# Patient Record
Sex: FEMALE | Race: ASIAN | Employment: FULL TIME | URBAN - METROPOLITAN AREA
[De-identification: names, ages, dates, MRNs, and addresses within clinical notes are randomized per-mention and may not be internally consistent; named-entity substitution may affect disease eponyms.]

---

## 2017-02-06 ENCOUNTER — OFFICE VISIT (OUTPATIENT)
Dept: OBGYN CLINIC | Facility: CLINIC | Age: 22
End: 2017-02-06

## 2017-02-06 ENCOUNTER — APPOINTMENT (OUTPATIENT)
Dept: LAB | Facility: HOSPITAL | Age: 22
End: 2017-02-06
Attending: OBSTETRICS & GYNECOLOGY
Payer: COMMERCIAL

## 2017-02-06 ENCOUNTER — TELEPHONE (OUTPATIENT)
Dept: OBGYN CLINIC | Facility: CLINIC | Age: 22
End: 2017-02-06

## 2017-02-06 VITALS
DIASTOLIC BLOOD PRESSURE: 74 MMHG | WEIGHT: 254 LBS | SYSTOLIC BLOOD PRESSURE: 119 MMHG | HEART RATE: 85 BPM | BODY MASS INDEX: 40.82 KG/M2 | HEIGHT: 66.25 IN

## 2017-02-06 DIAGNOSIS — Z30.011 BCP (BIRTH CONTROL PILLS) INITIATION: ICD-10-CM

## 2017-02-06 DIAGNOSIS — Z01.419 ENCOUNTER FOR GYNECOLOGICAL EXAMINATION WITHOUT ABNORMAL FINDING: Primary | ICD-10-CM

## 2017-02-06 DIAGNOSIS — Z11.3 SCREEN FOR STD (SEXUALLY TRANSMITTED DISEASE): ICD-10-CM

## 2017-02-06 DIAGNOSIS — Z12.4 CERVICAL CANCER SCREENING: ICD-10-CM

## 2017-02-06 LAB
HBV SURFACE AG SERPL QL IA: NONREACTIVE
HCG SERPL QL: NEGATIVE
HCV AB SERPL QL IA: NONREACTIVE
HIV1+2 AB SERPL QL IA: NONREACTIVE
T PALLIDUM AB SER QL: NEGATIVE

## 2017-02-06 PROCEDURE — 84703 CHORIONIC GONADOTROPIN ASSAY: CPT

## 2017-02-06 PROCEDURE — 87340 HEPATITIS B SURFACE AG IA: CPT

## 2017-02-06 PROCEDURE — 87389 HIV-1 AG W/HIV-1&-2 AB AG IA: CPT

## 2017-02-06 PROCEDURE — 36415 COLL VENOUS BLD VENIPUNCTURE: CPT

## 2017-02-06 PROCEDURE — 86803 HEPATITIS C AB TEST: CPT

## 2017-02-06 PROCEDURE — 99395 PREV VISIT EST AGE 18-39: CPT | Performed by: OBSTETRICS & GYNECOLOGY

## 2017-02-06 PROCEDURE — 86780 TREPONEMA PALLIDUM: CPT

## 2017-02-06 NOTE — PROGRESS NOTES
Well Woman Exam    HPI:  The patient is a 21yo female who presents today for annual exam. She has no complaints. She has not been taking her OCPs for the last few months and has not had a period in over 3 months.  She states she has just forgotten to take t Father 59     Lymphoma    • Hyperlipidemia[other] [OTHER] Brother    • Lipids Brother      hyperlipidemia   • Diabetes         MEDICATIONS:    Current outpatient prescriptions:   •  Norethin-Eth Estradiol-Fe (ZENCHENT FE) 0.4-35 MG-MCG Oral Chew Tab, CHEW by patient being uncomfortable and scared   Perineum: normal    Assessment/Plan:  1. WWE:   1. Reviewed ASCCP guidelines with the patient   2. Tried pap today- hindered by patient being uncomfortable. 2. Contraception:   1.  OCPs  Risks of OCP's especiall

## 2017-02-06 NOTE — TELEPHONE ENCOUNTER
Called reference lab to see why STD screening was drawn today but HCG was not.  Lab is unsure what happened and will add HCG to current blood already in the lab

## 2017-02-07 LAB
C TRACH DNA SPEC QL NAA+PROBE: NEGATIVE
N GONORRHOEA DNA SPEC QL NAA+PROBE: NEGATIVE

## 2017-02-08 ENCOUNTER — TELEPHONE (OUTPATIENT)
Dept: OBGYN CLINIC | Facility: CLINIC | Age: 22
End: 2017-02-08

## 2017-02-08 RX ORDER — NORGESTIMATE AND ETHINYL ESTRADIOL 7DAYSX3 28
1 KIT ORAL DAILY
Qty: 28 TABLET | Refills: 12 | Status: SHIPPED | OUTPATIENT
Start: 2017-02-08 | End: 2017-03-08

## 2017-02-08 NOTE — TELEPHONE ENCOUNTER
Please let patient know that her STD screening and Pap were all negative. Her HCG was also negative and OCPs have been sent to her pharmacy. She can start them at anytime.

## 2017-02-08 NOTE — TELEPHONE ENCOUNTER
Informed pt that her STD screening and pap were all negative. Informed pt that her hcg was also negative and ocps have been sent to the pharmacy. Informed pt that [address] stated she can start them at anytime. Pt stated understanding.

## 2017-03-09 ENCOUNTER — HOSPITAL ENCOUNTER (OUTPATIENT)
Dept: ULTRASOUND IMAGING | Age: 22
Discharge: HOME | End: 2017-03-09
Attending: INTERNAL MEDICINE
Payer: COMMERCIAL

## 2017-03-09 DIAGNOSIS — R31.9 HEMATURIA: ICD-10-CM

## 2017-03-09 PROCEDURE — 76770 US EXAM ABDO BACK WALL COMP: CPT

## 2017-03-15 ENCOUNTER — HOSPITAL ENCOUNTER (EMERGENCY)
Facility: HOSPITAL | Age: 22
Discharge: HOME | End: 2017-03-15
Attending: EMERGENCY MEDICINE
Payer: COMMERCIAL

## 2017-03-15 VITALS
TEMPERATURE: 98 F | RESPIRATION RATE: 18 BRPM | HEART RATE: 71 BPM | DIASTOLIC BLOOD PRESSURE: 70 MMHG | SYSTOLIC BLOOD PRESSURE: 131 MMHG | OXYGEN SATURATION: 99 %

## 2017-03-15 DIAGNOSIS — J01.80 OTHER ACUTE SINUSITIS: Primary | ICD-10-CM

## 2017-03-15 PROCEDURE — 99283 EMERGENCY DEPT VISIT LOW MDM: CPT

## 2017-03-15 RX ORDER — DOXYCYCLINE HYCLATE 100 MG/1
100 CAPSULE ORAL 2 TIMES DAILY
Qty: 20 CAPSULE | Refills: 0 | Status: SHIPPED | OUTPATIENT
Start: 2017-03-15 | End: 2017-03-25

## 2017-03-15 NOTE — ED NOTES
C/o sore throat and rt ear pain , states sore throat x 10 day, seen by pmd w/- strep and - flu.  Talking clear  But softly

## 2017-03-15 NOTE — ED INITIAL ASSESSMENT (HPI)
Pt has seen her PMD 3 times for the same thing. Sore throat and runny nose. Pt was told that it was a viral infection. Pt sts she is getting worse everyday.

## 2017-03-15 NOTE — ED PROVIDER NOTES
Patient Seen in: [facility] AND [facility] Emergency Department    History   Patient presents with:  Sore Throat    Stated Complaint: sore throat, cough    HPI    Pt is a 18 yo F who p/w sinus congestion x 10 days.  Pt states she goes through a box of Kleenex a d (Oral)  Resp 18  SpO2 99%        Physical Exam    GENERAL: No acute distress, awake and alert  HEENT: MMM, EOMI, PERRL.  TMS normal. Oropharynx normal, uvula midline, no abscess  Neck: supple, non tender, no meningeal signs, no LAD  CV: RRR, no murmurs  Res

## 2017-09-29 ENCOUNTER — HOSPITAL ENCOUNTER (OUTPATIENT)
Dept: ULTRASOUND IMAGING | Age: 22
Discharge: HOME | End: 2017-09-29
Attending: INTERNAL MEDICINE
Payer: COMMERCIAL

## 2017-09-29 ENCOUNTER — LAB ENCOUNTER (OUTPATIENT)
Dept: LAB | Age: 22
End: 2017-09-29
Attending: INTERNAL MEDICINE
Payer: COMMERCIAL

## 2017-09-29 DIAGNOSIS — R22.1 LOCALIZED SWELLING, MASS AND LUMP, NECK: ICD-10-CM

## 2017-09-29 DIAGNOSIS — R22.1 SWOLLEN NECK: Primary | ICD-10-CM

## 2017-09-29 DIAGNOSIS — E07.9 THYROID MASS: ICD-10-CM

## 2017-09-29 PROCEDURE — 84439 ASSAY OF FREE THYROXINE: CPT

## 2017-09-29 PROCEDURE — 84443 ASSAY THYROID STIM HORMONE: CPT

## 2017-09-29 PROCEDURE — 36415 COLL VENOUS BLD VENIPUNCTURE: CPT

## 2017-09-29 PROCEDURE — 85025 COMPLETE CBC W/AUTO DIFF WBC: CPT

## 2017-09-29 PROCEDURE — 76536 US EXAM OF HEAD AND NECK: CPT | Performed by: INTERNAL MEDICINE

## 2017-10-11 ENCOUNTER — OFFICE VISIT (OUTPATIENT)
Dept: ENDOCRINOLOGY CLINIC | Facility: CLINIC | Age: 22
End: 2017-10-11

## 2017-10-11 VITALS
HEIGHT: 67 IN | WEIGHT: 276.38 LBS | DIASTOLIC BLOOD PRESSURE: 71 MMHG | BODY MASS INDEX: 43.38 KG/M2 | HEART RATE: 94 BPM | SYSTOLIC BLOOD PRESSURE: 104 MMHG

## 2017-10-11 DIAGNOSIS — E04.1 THYROID NODULE: Primary | ICD-10-CM

## 2017-10-11 PROCEDURE — 99212 OFFICE O/P EST SF 10 MIN: CPT | Performed by: INTERNAL MEDICINE

## 2017-10-11 PROCEDURE — 99243 OFF/OP CNSLTJ NEW/EST LOW 30: CPT | Performed by: INTERNAL MEDICINE

## 2017-10-11 NOTE — H&P
New Patient Evaluation - History and Physical    CONSULT - Reason for Visit: Thyroid nodule  Requesting Physician: Barbara Guzman MD    CHIEF COMPLAINT:    Thyroid nodule     HISTORY OF PRESENT ILLNESS:   Barnett Skiff is a 23year old female who presents f Other        ASSESSMENTS:       REVIEW OF SYSTEMS:  Constitutional: Negative for: weight change, fever, fatigue, cold/heat intolerance  Eyes: Negative for:  Visual changes, proptosis, blurring  ENT: Negative for:  dysphagia, neck swelling, dysphonia  Respi Heterogeneous echogenicity. No masses. OTHER:             No masses or enlarged/pathologic appearing adenopathy. A few small normal benign lymph nodes left neck. CONCLUSION:   1. Bilateral thyroid lobe enlargement, right greater than left.  Mild

## 2017-10-18 ENCOUNTER — LAB ENCOUNTER (OUTPATIENT)
Dept: LAB | Age: 22
End: 2017-10-18
Attending: OBSTETRICS & GYNECOLOGY
Payer: COMMERCIAL

## 2017-10-18 ENCOUNTER — OFFICE VISIT (OUTPATIENT)
Dept: OBGYN CLINIC | Facility: CLINIC | Age: 22
End: 2017-10-18

## 2017-10-18 VITALS
HEART RATE: 74 BPM | SYSTOLIC BLOOD PRESSURE: 127 MMHG | BODY MASS INDEX: 43 KG/M2 | DIASTOLIC BLOOD PRESSURE: 85 MMHG | WEIGHT: 276 LBS

## 2017-10-18 DIAGNOSIS — N94.10 DYSPAREUNIA IN FEMALE: ICD-10-CM

## 2017-10-18 DIAGNOSIS — N94.2 VAGINISMUS: Primary | ICD-10-CM

## 2017-10-18 DIAGNOSIS — E28.2 PCOS (POLYCYSTIC OVARIAN SYNDROME): ICD-10-CM

## 2017-10-18 PROCEDURE — 84703 CHORIONIC GONADOTROPIN ASSAY: CPT

## 2017-10-18 PROCEDURE — 99213 OFFICE O/P EST LOW 20 MIN: CPT | Performed by: OBSTETRICS & GYNECOLOGY

## 2017-10-18 PROCEDURE — 36415 COLL VENOUS BLD VENIPUNCTURE: CPT

## 2017-10-18 RX ORDER — NORETHINDRONE ACETATE AND ETHINYL ESTRADIOL 1MG-20(21)
1 KIT ORAL DAILY
Qty: 1 PACKAGE | Refills: 12 | Status: SHIPPED | OUTPATIENT
Start: 2017-10-18 | End: 2018-02-07
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
## 2017-10-19 ENCOUNTER — TELEPHONE (OUTPATIENT)
Dept: ENDOCRINOLOGY CLINIC | Facility: CLINIC | Age: 22
End: 2017-10-19

## 2017-10-19 NOTE — TELEPHONE ENCOUNTER
No, unlikely related given the small size of nodule in the gland. Please follow up with PCP regarding the small bump behind her ear more likely a lymph node.

## 2017-10-19 NOTE — TELEPHONE ENCOUNTER
Pt was wondering if lump found in thyroid may be associated with lump she found in the back of right ear. Pls call - aware AM is not in office. Kevin perez.

## 2017-10-19 NOTE — TELEPHONE ENCOUNTER
Spoke with patient and informed her per [facility] unlikely that the bump behind her ear is related to the thyroid. She is agreeable with plan to follow up with PCP.

## 2017-10-21 ENCOUNTER — TELEPHONE (OUTPATIENT)
Dept: OBGYN CLINIC | Facility: CLINIC | Age: 22
End: 2017-10-21

## 2017-10-21 NOTE — TELEPHONE ENCOUNTER
----- Message from Jayashree Hogue MD sent at 10/19/2017  8:16 AM CDT -----  Please let patient now her HCG was negative.  Ok to start OCPs

## 2017-10-23 ENCOUNTER — OFFICE VISIT (OUTPATIENT)
Dept: OTOLARYNGOLOGY | Facility: CLINIC | Age: 22
End: 2017-10-23

## 2017-10-23 VITALS
WEIGHT: 276 LBS | HEART RATE: 79 BPM | DIASTOLIC BLOOD PRESSURE: 66 MMHG | SYSTOLIC BLOOD PRESSURE: 104 MMHG | HEIGHT: 67 IN | TEMPERATURE: 98 F | BODY MASS INDEX: 43.32 KG/M2 | RESPIRATION RATE: 16 BRPM

## 2017-10-23 DIAGNOSIS — R22.0 MASS OF POSTAURICULAR AREA: Primary | ICD-10-CM

## 2017-10-23 PROCEDURE — 99212 OFFICE O/P EST SF 10 MIN: CPT | Performed by: OTOLARYNGOLOGY

## 2017-10-23 PROCEDURE — 99243 OFF/OP CNSLTJ NEW/EST LOW 30: CPT | Performed by: OTOLARYNGOLOGY

## 2017-10-24 NOTE — PROGRESS NOTES
Luh Grace is a 23year old female. Patient presents with:  Lump: Patient present for consult for lump behind right ear for past 10 days. Denies pain or discharge. HPI:   Feels a bump behind her right ear. No change during this time.  No pain or r affect. Lymph Detail Normal Submental. Submandibular. Anterior cervical. Posterior cervical. Supraclavicular.    Eyes Normal Conjunctiva - Right: Normal, Left: Normal. Pupil - Right: Normal, Left: Normal.    Ears Normal Inspection - Right: Normal, Left: N

## 2017-12-29 ENCOUNTER — LAB ENCOUNTER (OUTPATIENT)
Dept: LAB | Age: 22
End: 2017-12-29
Attending: INTERNAL MEDICINE
Payer: COMMERCIAL

## 2017-12-29 DIAGNOSIS — R63.5 ABNORMAL WEIGHT GAIN: ICD-10-CM

## 2017-12-29 DIAGNOSIS — M62.89 MUSCLE FATIGUE: Primary | ICD-10-CM

## 2017-12-29 PROCEDURE — 85025 COMPLETE CBC W/AUTO DIFF WBC: CPT

## 2017-12-29 PROCEDURE — 80061 LIPID PANEL: CPT

## 2017-12-29 PROCEDURE — 84443 ASSAY THYROID STIM HORMONE: CPT

## 2017-12-29 PROCEDURE — 80053 COMPREHEN METABOLIC PANEL: CPT

## 2017-12-29 PROCEDURE — 36415 COLL VENOUS BLD VENIPUNCTURE: CPT

## 2017-12-29 PROCEDURE — 81001 URINALYSIS AUTO W/SCOPE: CPT

## 2017-12-29 PROCEDURE — 84439 ASSAY OF FREE THYROXINE: CPT

## 2018-01-08 ENCOUNTER — APPOINTMENT (OUTPATIENT)
Dept: CT IMAGING | Facility: HOSPITAL | Age: 23
End: 2018-01-08
Attending: EMERGENCY MEDICINE
Payer: COMMERCIAL

## 2018-01-08 ENCOUNTER — HOSPITAL ENCOUNTER (EMERGENCY)
Facility: HOSPITAL | Age: 23
Discharge: HOME | End: 2018-01-08
Attending: EMERGENCY MEDICINE
Payer: COMMERCIAL

## 2018-01-08 VITALS
HEIGHT: 67 IN | HEART RATE: 104 BPM | WEIGHT: 270 LBS | SYSTOLIC BLOOD PRESSURE: 116 MMHG | TEMPERATURE: 99 F | DIASTOLIC BLOOD PRESSURE: 60 MMHG | OXYGEN SATURATION: 96 % | BODY MASS INDEX: 42.38 KG/M2 | RESPIRATION RATE: 20 BRPM

## 2018-01-08 DIAGNOSIS — N30.00 ACUTE CYSTITIS WITHOUT HEMATURIA: Primary | ICD-10-CM

## 2018-01-08 DIAGNOSIS — R91.1 PULMONARY NODULE: ICD-10-CM

## 2018-01-08 DIAGNOSIS — K52.9 GASTROENTERITIS: ICD-10-CM

## 2018-01-08 LAB
ALBUMIN SERPL BCP-MCNC: 3.7 G/DL (ref 3.5–4.8)
ALBUMIN/GLOB SERPL: 1 {RATIO} (ref 1–2)
ALP SERPL-CCNC: 57 U/L (ref 32–100)
ALT SERPL-CCNC: 42 U/L (ref 14–54)
ANION GAP SERPL CALC-SCNC: 10 MMOL/L (ref 0–18)
AST SERPL-CCNC: 34 U/L (ref 15–41)
B-HCG UR QL: NEGATIVE
BASOPHILS # BLD: 0 K/UL (ref 0–0.2)
BASOPHILS NFR BLD: 0 %
BILIRUB SERPL-MCNC: 0.5 MG/DL (ref 0.3–1.2)
BILIRUB UR QL: NEGATIVE
BUN SERPL-MCNC: 13 MG/DL (ref 8–20)
BUN/CREAT SERPL: 19.1 (ref 10–20)
CALCIUM SERPL-MCNC: 8.7 MG/DL (ref 8.5–10.5)
CHLORIDE SERPL-SCNC: 101 MMOL/L (ref 95–110)
CO2 SERPL-SCNC: 25 MMOL/L (ref 22–32)
COLOR UR: YELLOW
CREAT SERPL-MCNC: 0.68 MG/DL (ref 0.5–1.5)
EOSINOPHIL # BLD: 0.1 K/UL (ref 0–0.7)
EOSINOPHIL NFR BLD: 1 %
ERYTHROCYTE [DISTWIDTH] IN BLOOD BY AUTOMATED COUNT: 13.9 % (ref 11–15)
GLOBULIN PLAS-MCNC: 3.7 G/DL (ref 2.5–3.7)
GLUCOSE SERPL-MCNC: 115 MG/DL (ref 70–99)
GLUCOSE UR-MCNC: NEGATIVE MG/DL
HCT VFR BLD AUTO: 43.5 % (ref 35–48)
HGB BLD-MCNC: 14.5 G/DL (ref 12–16)
HGB UR QL STRIP.AUTO: NEGATIVE
KETONES UR-MCNC: NEGATIVE MG/DL
LIPASE SERPL-CCNC: 19 U/L (ref 22–51)
LYMPHOCYTES # BLD: 1.9 K/UL (ref 1–4)
LYMPHOCYTES NFR BLD: 12 %
MCH RBC QN AUTO: 26.6 PG (ref 27–32)
MCHC RBC AUTO-ENTMCNC: 33.3 G/DL (ref 32–37)
MCV RBC AUTO: 79.7 FL (ref 80–100)
MONOCYTES # BLD: 0.7 K/UL (ref 0–1)
MONOCYTES NFR BLD: 4 %
NEUTROPHILS # BLD AUTO: 13.3 K/UL (ref 1.8–7.7)
NEUTROPHILS NFR BLD: 83 %
NITRITE UR QL STRIP.AUTO: POSITIVE
OSMOLALITY UR CALC.SUM OF ELEC: 283 MOSM/KG (ref 275–295)
PH UR: 7 [PH] (ref 5–8)
PLATELET # BLD AUTO: 278 K/UL (ref 140–400)
PMV BLD AUTO: 9.2 FL (ref 7.4–10.3)
POTASSIUM SERPL-SCNC: 4 MMOL/L (ref 3.3–5.1)
PROT SERPL-MCNC: 7.4 G/DL (ref 5.9–8.4)
PROT UR-MCNC: NEGATIVE MG/DL
RBC # BLD AUTO: 5.45 M/UL (ref 3.7–5.4)
RBC #/AREA URNS AUTO: 1 /HPF
SODIUM SERPL-SCNC: 136 MMOL/L (ref 136–144)
SP GR UR STRIP: 1.02 (ref 1–1.03)
UROBILINOGEN UR STRIP-ACNC: <2
VIT C UR-MCNC: NEGATIVE MG/DL
WBC # BLD AUTO: 16 K/UL (ref 4–11)
WBC #/AREA URNS AUTO: 24 /HPF

## 2018-01-08 PROCEDURE — 74177 CT ABD & PELVIS W/CONTRAST: CPT | Performed by: EMERGENCY MEDICINE

## 2018-01-08 PROCEDURE — 80053 COMPREHEN METABOLIC PANEL: CPT | Performed by: EMERGENCY MEDICINE

## 2018-01-08 PROCEDURE — 83690 ASSAY OF LIPASE: CPT | Performed by: EMERGENCY MEDICINE

## 2018-01-08 PROCEDURE — 96361 HYDRATE IV INFUSION ADD-ON: CPT

## 2018-01-08 PROCEDURE — 96374 THER/PROPH/DIAG INJ IV PUSH: CPT

## 2018-01-08 PROCEDURE — 87086 URINE CULTURE/COLONY COUNT: CPT | Performed by: EMERGENCY MEDICINE

## 2018-01-08 PROCEDURE — 96375 TX/PRO/DX INJ NEW DRUG ADDON: CPT

## 2018-01-08 PROCEDURE — 85025 COMPLETE CBC W/AUTO DIFF WBC: CPT | Performed by: EMERGENCY MEDICINE

## 2018-01-08 PROCEDURE — S0028 INJECTION, FAMOTIDINE, 20 MG: HCPCS | Performed by: EMERGENCY MEDICINE

## 2018-01-08 PROCEDURE — 81025 URINE PREGNANCY TEST: CPT

## 2018-01-08 PROCEDURE — 99284 EMERGENCY DEPT VISIT MOD MDM: CPT

## 2018-01-08 PROCEDURE — 81001 URINALYSIS AUTO W/SCOPE: CPT | Performed by: EMERGENCY MEDICINE

## 2018-01-08 RX ORDER — KETOROLAC TROMETHAMINE 15 MG/ML
15 INJECTION, SOLUTION INTRAMUSCULAR; INTRAVENOUS ONCE
Status: COMPLETED | OUTPATIENT
Start: 2018-01-08 | End: 2018-01-08

## 2018-01-08 RX ORDER — ONDANSETRON 4 MG/1
4 TABLET, ORALLY DISINTEGRATING ORAL EVERY 8 HOURS PRN
Qty: 6 TABLET | Refills: 0 | Status: SHIPPED | OUTPATIENT
Start: 2018-01-08 | End: 2019-08-16

## 2018-01-08 RX ORDER — METOCLOPRAMIDE HYDROCHLORIDE 5 MG/ML
5 INJECTION INTRAMUSCULAR; INTRAVENOUS ONCE
Status: COMPLETED | OUTPATIENT
Start: 2018-01-08 | End: 2018-01-08

## 2018-01-08 RX ORDER — ONDANSETRON 2 MG/ML
4 INJECTION INTRAMUSCULAR; INTRAVENOUS ONCE
Status: COMPLETED | OUTPATIENT
Start: 2018-01-08 | End: 2018-01-08

## 2018-01-08 RX ORDER — FAMOTIDINE 10 MG/ML
20 INJECTION, SOLUTION INTRAVENOUS ONCE
Status: COMPLETED | OUTPATIENT
Start: 2018-01-08 | End: 2018-01-08

## 2018-01-08 RX ORDER — NITROFURANTOIN 25; 75 MG/1; MG/1
100 CAPSULE ORAL 2 TIMES DAILY
Qty: 14 CAPSULE | Refills: 0 | Status: SHIPPED | OUTPATIENT
Start: 2018-01-08 | End: 2018-01-15

## 2018-01-08 RX ORDER — DICYCLOMINE HYDROCHLORIDE 10 MG/5ML
20 SOLUTION ORAL ONCE
Status: COMPLETED | OUTPATIENT
Start: 2018-01-08 | End: 2018-01-08

## 2018-01-08 NOTE — ED NOTES
Assumed care of patient from triage. Patient to ED from home for N/V/D. Patient states that she started having N/V/D since approximately 2200 last night. Patient reports N/V x6.  Patient is alert and oriented x4, breathing with ease, ambulating with steady

## 2018-01-08 NOTE — ED NOTES
Pt states nausea improved after ordered medications given, but pt states mid abd pain still present and rates it 7/10 \"burning\" pain.

## 2018-01-08 NOTE — ED PROVIDER NOTES
Patient Seen in: [facility] AND [facility] Emergency Department    History   Patient presents with:  Nausea/Vomiting/Diarrhea (gastrointestinal)    Stated Complaint: n/v/d    HPI    75-year-old female with somewhat acute onset of nausea vomiting diarrhea startin person, place, and time. Skin: Skin is warm and dry. Psychiatric: Normal mood and affect. Behavior is normal.   Nursing note and vitals reviewed. Differential diagnosis includes viral syndrome, gastritis and enteritis, UTI.       ED Course     Labs [facility], X CHEST PA LAT ROUTINE, 8/20/2014, 8:46. INDICATIONS: Patient is having nausea vomiting and diarrhea since last night with abdomen pain .   TECHNIQUE: CT images of the abdomen and pelvis were obtained with non-ionic intravenous contrast material. within the right colon suggestive of diarrhea. Normal appendix. 2 nodular opacities within the right lung measuring up to 2 cm within the right lower lobe. Findings may relate to remote inflammation. Pneumonia is felt less likely.  Followup CT recommended

## 2018-01-15 ENCOUNTER — TELEPHONE (OUTPATIENT)
Dept: PULMONOLOGY | Facility: CLINIC | Age: 23
End: 2018-01-15

## 2018-01-18 ENCOUNTER — OFFICE VISIT (OUTPATIENT)
Dept: PULMONOLOGY | Facility: CLINIC | Age: 23
End: 2018-01-18

## 2018-01-18 VITALS
HEIGHT: 67 IN | WEIGHT: 286 LBS | HEART RATE: 98 BPM | OXYGEN SATURATION: 98 % | TEMPERATURE: 98 F | BODY MASS INDEX: 44.89 KG/M2 | DIASTOLIC BLOOD PRESSURE: 82 MMHG | SYSTOLIC BLOOD PRESSURE: 126 MMHG

## 2018-01-18 DIAGNOSIS — R91.8 PULMONARY NODULES: Primary | ICD-10-CM

## 2018-01-18 PROCEDURE — 99212 OFFICE O/P EST SF 10 MIN: CPT | Performed by: INTERNAL MEDICINE

## 2018-01-18 PROCEDURE — 99244 OFF/OP CNSLTJ NEW/EST MOD 40: CPT | Performed by: INTERNAL MEDICINE

## 2018-01-18 RX ORDER — CLOBETASOL PROPIONATE 0.5 MG/G
OINTMENT TOPICAL
Refills: 0 | COMMUNITY
Start: 2017-12-02 | End: 2019-08-16

## 2018-01-18 NOTE — PROGRESS NOTES
Dear Leopold Glen:           As you know, Fouzia Flores is a 15-year-old female who I am now evaluating for pulmonary nodules. HISTORY OF PRESENT ILLNESS: The patient is a lifetime non-smoker. She grew up in the area but has visited [place].   She has no history of Neurologic grossly intact with symmetric tone and strength and reflex. Crowded oropharynx Mallampati score 3. LABORATORY: CT scan the abdomen– Nondilated fluid-filled loops of small bowel are nonspecific but can be seen with enteritis.      Liquid stool

## 2018-01-18 NOTE — PATIENT INSTRUCTIONS
Prior authorization is necessary for CT, [facility] (p. #[phone]) will obtain prior authorization, and notify you when you can proceed to schedule the test. Thanks.

## 2018-01-31 ENCOUNTER — HOSPITAL ENCOUNTER (OUTPATIENT)
Dept: CT IMAGING | Facility: HOSPITAL | Age: 23
Discharge: HOME | End: 2018-01-31
Attending: INTERNAL MEDICINE
Payer: COMMERCIAL

## 2018-01-31 ENCOUNTER — APPOINTMENT (OUTPATIENT)
Dept: LAB | Facility: HOSPITAL | Age: 23
End: 2018-01-31
Attending: INTERNAL MEDICINE
Payer: COMMERCIAL

## 2018-01-31 DIAGNOSIS — R91.8 PULMONARY NODULES: ICD-10-CM

## 2018-01-31 LAB — CREAT BLD-MCNC: 0.7 MG/DL (ref 0.5–1.5)

## 2018-01-31 PROCEDURE — 86480 TB TEST CELL IMMUN MEASURE: CPT

## 2018-01-31 PROCEDURE — 36415 COLL VENOUS BLD VENIPUNCTURE: CPT

## 2018-01-31 PROCEDURE — 82565 ASSAY OF CREATININE: CPT

## 2018-01-31 PROCEDURE — 71260 CT THORAX DX C+: CPT | Performed by: INTERNAL MEDICINE

## 2018-02-02 LAB
M TB IFN-G CD4+ BCKGRND COR BLD-ACNC: -0 IU/ML
M TB IFN-G CD4+ T-CELLS BLD-ACNC: 0.02 IU/ML
M TB TUBERC IFN-G BLD QL: NEGATIVE
M TB TUBERC IGNF/MITOGEN IGNF CONTROL: >10 IU/ML

## 2018-02-06 ENCOUNTER — TELEPHONE (OUTPATIENT)
Dept: PULMONOLOGY | Facility: CLINIC | Age: 23
End: 2018-02-06

## 2018-02-06 DIAGNOSIS — R91.1 PULMONARY NODULE: Primary | ICD-10-CM

## 2018-02-06 DIAGNOSIS — R91.8 PULMONARY NODULES: ICD-10-CM

## 2018-02-06 NOTE — TELEPHONE ENCOUNTER
RN, I spoke with the patient regarding the results of her CT scan of the chest.  1 of the nodular abnormalities is resolved and another persisted.   Please add to the calendar a repeat CT scan the chest at the 3 month interval.

## 2018-02-07 ENCOUNTER — OFFICE VISIT (OUTPATIENT)
Dept: OBGYN CLINIC | Facility: CLINIC | Age: 23
End: 2018-02-07

## 2018-02-07 VITALS
BODY MASS INDEX: 45 KG/M2 | HEART RATE: 86 BPM | DIASTOLIC BLOOD PRESSURE: 88 MMHG | WEIGHT: 286 LBS | SYSTOLIC BLOOD PRESSURE: 118 MMHG

## 2018-02-07 DIAGNOSIS — Z01.419 ENCOUNTER FOR GYNECOLOGICAL EXAMINATION WITHOUT ABNORMAL FINDING: Primary | ICD-10-CM

## 2018-02-07 DIAGNOSIS — Z30.41 ENCOUNTER FOR BIRTH CONTROL PILLS MAINTENANCE: ICD-10-CM

## 2018-02-07 PROCEDURE — 99395 PREV VISIT EST AGE 18-39: CPT | Performed by: OBSTETRICS & GYNECOLOGY

## 2018-02-07 RX ORDER — NORETHINDRONE ACETATE AND ETHINYL ESTRADIOL 1MG-20(21)
1 KIT ORAL DAILY
Qty: 1 PACKAGE | Refills: 12 | Status: SHIPPED | OUTPATIENT
Start: 2018-02-07 | End: 2019-02-07

## 2018-02-07 NOTE — PROGRESS NOTES
Well Woman Exam    HPI:  The patient is a 15yo female who presents for annual exam. She has no complaints today. She has light bleeding with OCPs. She is happy on OCPs. She states she has tried [employer] and no longer has dyspareunia.  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 , Disp: 1 Package, Rfl: 12  •  Clobetasol Propionate 0.05 % External Ointment, REMBERTO EXT AA BID, Disp: , Rfl: 0  •  ondansetron 4 MG Oral Tablet Dispersible, Take 1 tablet (4 mg total) by mouth every 8 (eight) hours as needed. , Disp: 6 normal    Assessment/Plan:  1. WWE:   1. Reviewed ASCCP guidelines with the patient   2. Pap negative 2017- repeat 2020  2. Contraception:   1. Continue OCPs  3. Breast Health:     1.  Reviewed current guidelines with recommendation to start mammograms at a

## 2018-04-24 ENCOUNTER — TELEPHONE (OUTPATIENT)
Dept: PULMONOLOGY | Facility: CLINIC | Age: 23
End: 2018-04-24

## 2018-04-24 NOTE — TELEPHONE ENCOUNTER
Managed Care: please process prior authorization for CT Chest due this upcoming month and contact patient. Thank you. Letter sent to patient to inform testing is due.

## 2018-05-05 ENCOUNTER — HOSPITAL ENCOUNTER (OUTPATIENT)
Dept: CT IMAGING | Facility: HOSPITAL | Age: 23
Discharge: HOME | End: 2018-05-05
Attending: INTERNAL MEDICINE
Payer: COMMERCIAL

## 2018-05-05 DIAGNOSIS — R91.8 PULMONARY NODULES: ICD-10-CM

## 2018-05-05 PROCEDURE — 82565 ASSAY OF CREATININE: CPT

## 2018-05-05 PROCEDURE — 71260 CT THORAX DX C+: CPT | Performed by: INTERNAL MEDICINE

## 2018-05-07 ENCOUNTER — TELEPHONE (OUTPATIENT)
Dept: PULMONOLOGY | Facility: CLINIC | Age: 23
End: 2018-05-07

## 2018-05-07 NOTE — TELEPHONE ENCOUNTER
Pt states she 2 CTs done and spoke with dr Thierno Kirby this morning and advised her to call and get appt set up please call thank you.

## 2018-05-07 NOTE — TELEPHONE ENCOUNTER
PJC please advise on appt for pt, please note no availability this Thurs 5/10 or Monday 5/14. Thanks.

## 2018-05-09 NOTE — TELEPHONE ENCOUNTER
No appointments available Monday 5/14 (verified with Nancy Andrews RN Supervisor), do you want to add Tues 5/15 @ 5pm?

## 2018-05-09 NOTE — TELEPHONE ENCOUNTER
No appointments available Monday 5/14 (verified with Carlos Burch RN Supervisor), do you want to add Tues 5/15 @ 5pm or Thurs 5/17 @ 2:15 pm?     LM for pt stting awaiting response re: appt will cb once we have date/time.

## 2018-05-17 ENCOUNTER — OFFICE VISIT (OUTPATIENT)
Dept: PULMONOLOGY | Facility: CLINIC | Age: 23
End: 2018-05-17

## 2018-05-17 VITALS
HEART RATE: 80 BPM | HEIGHT: 68 IN | RESPIRATION RATE: 18 BRPM | DIASTOLIC BLOOD PRESSURE: 68 MMHG | SYSTOLIC BLOOD PRESSURE: 95 MMHG | OXYGEN SATURATION: 96 % | BODY MASS INDEX: 44.35 KG/M2 | WEIGHT: 292.63 LBS

## 2018-05-17 DIAGNOSIS — R91.8 PULMONARY NODULES: Primary | ICD-10-CM

## 2018-05-17 PROCEDURE — 99212 OFFICE O/P EST SF 10 MIN: CPT | Performed by: INTERNAL MEDICINE

## 2018-05-17 PROCEDURE — 1111F DSCHRG MED/CURRENT MED MERGE: CPT | Performed by: INTERNAL MEDICINE

## 2018-05-17 PROCEDURE — 99213 OFFICE O/P EST LOW 20 MIN: CPT | Performed by: INTERNAL MEDICINE

## 2018-05-17 NOTE — PROGRESS NOTES
The patient is 78-year-old female who I know well from prior evaluation who comes in now for follow-up. Serial CT scanning was performed. There is subtle increase in the 2 nodular areas at the right lung base and one new nodular area.   She otherwise is d

## 2018-07-31 ENCOUNTER — TELEPHONE (OUTPATIENT)
Dept: PULMONOLOGY | Facility: CLINIC | Age: 23
End: 2018-07-31

## 2018-08-04 ENCOUNTER — HOSPITAL ENCOUNTER (OUTPATIENT)
Dept: CT IMAGING | Age: 23
Discharge: HOME | End: 2018-08-04
Attending: INTERNAL MEDICINE
Payer: COMMERCIAL

## 2018-08-04 DIAGNOSIS — R91.8 PULMONARY NODULES: ICD-10-CM

## 2018-08-04 LAB — CREAT BLD-MCNC: 0.6 MG/DL (ref 0.5–1.5)

## 2018-08-04 PROCEDURE — 82565 ASSAY OF CREATININE: CPT

## 2018-08-04 PROCEDURE — 71260 CT THORAX DX C+: CPT | Performed by: INTERNAL MEDICINE

## 2018-08-08 ENCOUNTER — TELEPHONE (OUTPATIENT)
Dept: PULMONOLOGY | Facility: CLINIC | Age: 23
End: 2018-08-08

## 2018-08-08 DIAGNOSIS — R93.89 ABNORMAL CT OF THE CHEST: Primary | ICD-10-CM

## 2018-08-08 NOTE — TELEPHONE ENCOUNTER
RN,  I spoke to the patient:  Please facilitate PET and she will need to see me in follow-up shortly thereafter as she does not feel good.

## 2018-08-08 NOTE — TELEPHONE ENCOUNTER
Spoke w/ pt provided phone # to [facility] [phone] and Central Scheduling phone # [phone], pt aware PA needed for PET prior to scheduling. Pt also instructed to cb clinic after PET is scheduled to be added to Dr. Hedy Figueora clinic as he directed. Pt voiced understanding. [facility]: please call patient w/ update on authorization of PET Scan. Thank You!

## 2018-08-10 ENCOUNTER — HOSPITAL ENCOUNTER (OUTPATIENT)
Dept: NUCLEAR MEDICINE | Facility: HOSPITAL | Age: 23
Discharge: HOME | End: 2018-08-10
Attending: INTERNAL MEDICINE
Payer: COMMERCIAL

## 2018-08-10 ENCOUNTER — TELEPHONE (OUTPATIENT)
Dept: PULMONOLOGY | Facility: CLINIC | Age: 23
End: 2018-08-10

## 2018-08-10 DIAGNOSIS — R93.89 ABNORMAL CT OF THE CHEST: ICD-10-CM

## 2018-08-10 LAB — GLUCOSE BLDC GLUCOMTR-MCNC: 122 MG/DL (ref 70–99)

## 2018-08-10 PROCEDURE — 82962 GLUCOSE BLOOD TEST: CPT

## 2018-08-10 PROCEDURE — 78815 PET IMAGE W/CT SKULL-THIGH: CPT | Performed by: INTERNAL MEDICINE

## 2018-08-10 NOTE — TELEPHONE ENCOUNTER
Pt states she had PET scan today and was instructed to make a follow up appt with PJC. Pls call. Thank you.

## 2018-08-10 NOTE — TELEPHONE ENCOUNTER
Pt offered appt Monday 8/13 @ 2:30 pm w/ Dr. Mauricio Flores to f/u after PET, pt accepted, location information given.

## 2018-08-13 ENCOUNTER — OFFICE VISIT (OUTPATIENT)
Dept: PULMONOLOGY | Facility: CLINIC | Age: 23
End: 2018-08-13
Payer: COMMERCIAL

## 2018-08-13 VITALS
OXYGEN SATURATION: 97 % | DIASTOLIC BLOOD PRESSURE: 70 MMHG | SYSTOLIC BLOOD PRESSURE: 102 MMHG | RESPIRATION RATE: 19 BRPM | HEART RATE: 73 BPM | HEIGHT: 67 IN | WEIGHT: 290.19 LBS | BODY MASS INDEX: 45.55 KG/M2

## 2018-08-13 DIAGNOSIS — R91.8 PULMONARY NODULES: Primary | ICD-10-CM

## 2018-08-13 PROCEDURE — 99212 OFFICE O/P EST SF 10 MIN: CPT | Performed by: INTERNAL MEDICINE

## 2018-08-13 PROCEDURE — 99213 OFFICE O/P EST LOW 20 MIN: CPT | Performed by: INTERNAL MEDICINE

## 2018-08-13 NOTE — PROGRESS NOTES
Patient is a 40-year-old female who I know well from prior evaluation and comes in now for follow-up. She notes in general she feels poorly. There is mild dyspnea.   Her mediastinal lymphadenopathy was positive and the pulmonary nodules led up to a mild d

## 2018-08-22 ENCOUNTER — ANESTHESIA EVENT (OUTPATIENT)
Dept: ENDOSCOPY | Facility: HOSPITAL | Age: 23
End: 2018-08-22

## 2018-08-22 ENCOUNTER — HOSPITAL ENCOUNTER (OUTPATIENT)
Facility: HOSPITAL | Age: 23
Setting detail: HOSPITAL OUTPATIENT SURGERY
Discharge: HOME | End: 2018-08-22
Attending: INTERNAL MEDICINE | Admitting: INTERNAL MEDICINE
Payer: COMMERCIAL

## 2018-08-22 ENCOUNTER — SURGERY (OUTPATIENT)
Age: 23
End: 2018-08-22

## 2018-08-22 ENCOUNTER — ANESTHESIA (OUTPATIENT)
Dept: ENDOSCOPY | Facility: HOSPITAL | Age: 23
End: 2018-08-22

## 2018-08-22 VITALS
BODY MASS INDEX: 43.95 KG/M2 | RESPIRATION RATE: 26 BRPM | OXYGEN SATURATION: 97 % | HEART RATE: 89 BPM | WEIGHT: 280 LBS | DIASTOLIC BLOOD PRESSURE: 79 MMHG | SYSTOLIC BLOOD PRESSURE: 126 MMHG | TEMPERATURE: 98 F | HEIGHT: 67 IN

## 2018-08-22 DIAGNOSIS — R59.9 LYMPH NODES ENLARGED: Primary | ICD-10-CM

## 2018-08-22 DIAGNOSIS — R59.0 MEDIASTINAL LYMPHADENOPATHY: ICD-10-CM

## 2018-08-22 DIAGNOSIS — R91.8 PULMONARY NODULES: ICD-10-CM

## 2018-08-22 LAB
APTT PPP: 26 SECONDS (ref 23.2–35.3)
B-HCG UR QL: NEGATIVE
BASOPHILS NFR FLD: 0 %
EOSINOPHIL NFR FLD: 0 %
INR BLD: 0.9 (ref 0.9–1.2)
LYMPHOCYTES NFR FLD: 26 %
M TB CMPLX RRNA SPEC QL PROBE: NEGATIVE
MONOCYTES NFR FLD: 49 %
NEUTROPHILS NFR FLD: 25 %
PLATELET # BLD AUTO: 266 K/UL (ref 140–400)
PROTHROMBIN TIME: 12 SECONDS (ref 11.8–14.5)
RBC # FLD: ABNORMAL /CUMM (ref ?–1)
WBC # FLD: 202 /CUMM (ref ?–1)
WBC OTHER NFR FLD: 0 %

## 2018-08-22 PROCEDURE — 0B9D8ZX DRAINAGE OF RIGHT MIDDLE LUNG LOBE, VIA NATURAL OR ARTIFICIAL OPENING ENDOSCOPIC, DIAGNOSTIC: ICD-10-PCS | Performed by: INTERNAL MEDICINE

## 2018-08-22 PROCEDURE — 07D78ZX EXTRACTION OF THORAX LYMPHATIC, VIA NATURAL OR ARTIFICIAL OPENING ENDOSCOPIC, DIAGNOSTIC: ICD-10-PCS | Performed by: INTERNAL MEDICINE

## 2018-08-22 PROCEDURE — 31652 BRONCH EBUS SAMPLNG 1/2 NODE: CPT | Performed by: INTERNAL MEDICINE

## 2018-08-22 RX ORDER — LIDOCAINE HYDROCHLORIDE 10 MG/ML
INJECTION, SOLUTION EPIDURAL; INFILTRATION; INTRACAUDAL; PERINEURAL AS NEEDED
Status: DISCONTINUED | OUTPATIENT
Start: 2018-08-22 | End: 2018-08-22 | Stop reason: SURG

## 2018-08-22 RX ORDER — ROCURONIUM BROMIDE 10 MG/ML
INJECTION, SOLUTION INTRAVENOUS AS NEEDED
Status: DISCONTINUED | OUTPATIENT
Start: 2018-08-22 | End: 2018-08-22 | Stop reason: SURG

## 2018-08-22 RX ORDER — SODIUM CHLORIDE, SODIUM LACTATE, POTASSIUM CHLORIDE, CALCIUM CHLORIDE 600; 310; 30; 20 MG/100ML; MG/100ML; MG/100ML; MG/100ML
INJECTION, SOLUTION INTRAVENOUS CONTINUOUS
Status: DISCONTINUED | OUTPATIENT
Start: 2018-08-22 | End: 2018-08-22

## 2018-08-22 RX ORDER — ONDANSETRON 2 MG/ML
INJECTION INTRAMUSCULAR; INTRAVENOUS AS NEEDED
Status: DISCONTINUED | OUTPATIENT
Start: 2018-08-22 | End: 2018-08-22 | Stop reason: SURG

## 2018-08-22 RX ORDER — NALOXONE HYDROCHLORIDE 0.4 MG/ML
80 INJECTION, SOLUTION INTRAMUSCULAR; INTRAVENOUS; SUBCUTANEOUS AS NEEDED
Status: DISCONTINUED | OUTPATIENT
Start: 2018-08-22 | End: 2018-08-22 | Stop reason: HOSPADM

## 2018-08-22 RX ORDER — MIDAZOLAM HYDROCHLORIDE 1 MG/ML
INJECTION INTRAMUSCULAR; INTRAVENOUS AS NEEDED
Status: DISCONTINUED | OUTPATIENT
Start: 2018-08-22 | End: 2018-08-22 | Stop reason: SURG

## 2018-08-22 RX ORDER — NEOSTIGMINE METHYLSULFATE 0.5 MG/ML
INJECTION INTRAVENOUS AS NEEDED
Status: DISCONTINUED | OUTPATIENT
Start: 2018-08-22 | End: 2018-08-22 | Stop reason: SURG

## 2018-08-22 RX ORDER — ONDANSETRON 2 MG/ML
4 INJECTION INTRAMUSCULAR; INTRAVENOUS EVERY 6 HOURS PRN
Status: DISCONTINUED | OUTPATIENT
Start: 2018-08-22 | End: 2018-08-22 | Stop reason: HOSPADM

## 2018-08-22 RX ORDER — METOCLOPRAMIDE HYDROCHLORIDE 5 MG/ML
10 INJECTION INTRAMUSCULAR; INTRAVENOUS EVERY 6 HOURS PRN
Status: DISCONTINUED | OUTPATIENT
Start: 2018-08-22 | End: 2018-08-22

## 2018-08-22 RX ORDER — SODIUM CHLORIDE, SODIUM LACTATE, POTASSIUM CHLORIDE, CALCIUM CHLORIDE 600; 310; 30; 20 MG/100ML; MG/100ML; MG/100ML; MG/100ML
INJECTION, SOLUTION INTRAVENOUS CONTINUOUS PRN
Status: DISCONTINUED | OUTPATIENT
Start: 2018-08-22 | End: 2018-08-22 | Stop reason: SURG

## 2018-08-22 RX ORDER — DEXAMETHASONE SODIUM PHOSPHATE 4 MG/ML
VIAL (ML) INJECTION AS NEEDED
Status: DISCONTINUED | OUTPATIENT
Start: 2018-08-22 | End: 2018-08-22 | Stop reason: SURG

## 2018-08-22 RX ORDER — GLYCOPYRROLATE 0.2 MG/ML
INJECTION INTRAMUSCULAR; INTRAVENOUS AS NEEDED
Status: DISCONTINUED | OUTPATIENT
Start: 2018-08-22 | End: 2018-08-22 | Stop reason: SURG

## 2018-08-22 RX ADMIN — ONDANSETRON 4 MG: 2 INJECTION INTRAMUSCULAR; INTRAVENOUS at 09:20:00

## 2018-08-22 RX ADMIN — DEXAMETHASONE SODIUM PHOSPHATE 4 MG: 4 MG/ML VIAL (ML) INJECTION at 09:20:00

## 2018-08-22 RX ADMIN — MIDAZOLAM HYDROCHLORIDE 2 MG: 1 INJECTION INTRAMUSCULAR; INTRAVENOUS at 09:10:00

## 2018-08-22 RX ADMIN — SODIUM CHLORIDE, SODIUM LACTATE, POTASSIUM CHLORIDE, CALCIUM CHLORIDE: 600; 310; 30; 20 INJECTION, SOLUTION INTRAVENOUS at 09:08:00

## 2018-08-22 RX ADMIN — NEOSTIGMINE METHYLSULFATE 5 MG: 0.5 INJECTION INTRAVENOUS at 10:36:00

## 2018-08-22 RX ADMIN — LIDOCAINE HYDROCHLORIDE 40 MG: 10 INJECTION, SOLUTION EPIDURAL; INFILTRATION; INTRACAUDAL; PERINEURAL at 09:10:00

## 2018-08-22 RX ADMIN — SODIUM CHLORIDE, SODIUM LACTATE, POTASSIUM CHLORIDE, CALCIUM CHLORIDE: 600; 310; 30; 20 INJECTION, SOLUTION INTRAVENOUS at 10:42:00

## 2018-08-22 RX ADMIN — ROCURONIUM BROMIDE 5 MG: 10 INJECTION, SOLUTION INTRAVENOUS at 09:10:00

## 2018-08-22 RX ADMIN — ROCURONIUM BROMIDE 5 MG: 10 INJECTION, SOLUTION INTRAVENOUS at 10:25:00

## 2018-08-22 RX ADMIN — ROCURONIUM BROMIDE 10 MG: 10 INJECTION, SOLUTION INTRAVENOUS at 09:28:00

## 2018-08-22 RX ADMIN — ROCURONIUM BROMIDE 20 MG: 10 INJECTION, SOLUTION INTRAVENOUS at 09:20:00

## 2018-08-22 RX ADMIN — GLYCOPYRROLATE 0.8 MG: 0.2 INJECTION INTRAMUSCULAR; INTRAVENOUS at 10:36:00

## 2018-08-22 NOTE — ANESTHESIA POSTPROCEDURE EVALUATION
Patient: Ynes Courtney    Procedure Summary     Date:  08/22/18 Room / Location:  [address] [place] [address] ENDOSCOPY 05 / [address] [place] [address] ENDOSCOPY    Anesthesia Start:  0908 Anesthesia Stop:      Procedures:       BRONCHOSCOPY (N/A )      ENDOBRONCHIAL ULTRASOUND (EBUS) (N/A ) Diagnos

## 2018-08-22 NOTE — PROCEDURES
[facility] HOSP - [facility]  Procedure Note  2018     Fabio Salazar Patient Status:  Hospital Outpatient Surgery    1995 MRN [id number]   Location [facility] ENDOSCOPY LAB SUITES Attending Nicolette Jo MD   Hosp Day # 0 PCP [facility]

## 2018-08-22 NOTE — ANESTHESIA PROCEDURE NOTES
Airway  Urgency: elective    Difficult airway (potentially. MP 3-4. small mouth opening. obese. Easy BMV. glidescope x1 attepmt. .small mouth opening even after succs)    General Information and Staff    Patient location during procedure:  Other (Note) (endo

## 2018-08-22 NOTE — H&P
History and physical:    Lance Valenzuela is a 57-year-old female who I am now evaluating for pulmonary nodules.     HISTORY OF PRESENT ILLNESS: The patient is a lifetime non-smoker. She grew up in the area but has visited [place].   She has no history of TB exposure Neurologic grossly intact with symmetric tone and strength and reflex.   Crowded oropharynx Mallampati score 3.     LABORATORY: CT scan the abdomen– Nondilated fluid-filled loops of small bowel are nonspecific but can be seen with enteritis.     Liquid stoo

## 2018-08-22 NOTE — ANESTHESIA PREPROCEDURE EVALUATION
Anesthesia PreOp Note    HPI:     Genie Wilks is a 20year old female who presents for preoperative consultation requested by: Floyd Torres MD    Date of Surgery: 8/22/2018    Procedure(s):  BRONCHOSCOPY  ENDOBRONCHIAL ULTRASOUND (EBUS)  Indicati file     Social History Main Topics   Smoking status: Never Smoker    Smokeless tobacco: Never Used    Alcohol use No    Comment: None.      Drug use: No    Comment: none    Sexual activity: Yes    Birth control/ protection: OCP     Other Topics Concern patient's questions were answered to the best of my ability. The patient desires the anesthetic management as planned.   Gonzales Singh  8/22/2018 8:48 AM

## 2018-08-23 ENCOUNTER — TELEPHONE (OUTPATIENT)
Dept: PULMONOLOGY | Facility: CLINIC | Age: 23
End: 2018-08-23

## 2018-08-23 LAB
CD19 CELLS NFR SPEC: 11 %
CD20 CELLS NFR SPEC: 14 %
CD45 CELLS NFR SPEC: 100 %
CELL SURF KAPPA/LAMBDA RATIO: 0.9
CELL SURF LAMBDA LIGHT CHAIN: 7 %
CELL SURFACE KAPPA LIGHT CHAIN: 6 %

## 2018-08-23 NOTE — TELEPHONE ENCOUNTER
I called the patient and reassured that I did not see any evidence of cancer on the pathology from the lymph node biopsy. Cultures are still pending. The patient will see me in the office in 6-12 weeks.

## 2018-08-23 NOTE — TELEPHONE ENCOUNTER
Informed pt of Dr. Leah granado.  Explained MD awaiting preliminary results & results still in process, cultures do take time to grow (final results take 6 wks for AFB, 4 wks for fungus, 1-2 wks for tissue, & at least 72 hrs for bronchial per Micro), will

## 2018-08-24 NOTE — TELEPHONE ENCOUNTER
Discussed Dr. Cole De La O orders w/ pt. Sched her on 10/4 12:30 pm [facility]. Explained we will contact her once results avail, reviewed by MD, & she may call us to check status closer to when results will be avail. Reassured pt. She voiced understanding.

## 2018-09-04 ENCOUNTER — OFFICE VISIT (OUTPATIENT)
Dept: PULMONOLOGY | Facility: CLINIC | Age: 23
End: 2018-09-04
Payer: COMMERCIAL

## 2018-09-04 ENCOUNTER — TELEPHONE (OUTPATIENT)
Dept: PULMONOLOGY | Facility: CLINIC | Age: 23
End: 2018-09-04

## 2018-09-04 VITALS
BODY MASS INDEX: 45.52 KG/M2 | OXYGEN SATURATION: 98 % | DIASTOLIC BLOOD PRESSURE: 79 MMHG | WEIGHT: 290 LBS | SYSTOLIC BLOOD PRESSURE: 117 MMHG | HEIGHT: 67 IN | HEART RATE: 97 BPM

## 2018-09-04 DIAGNOSIS — R11.0 NAUSEA: ICD-10-CM

## 2018-09-04 DIAGNOSIS — R91.8 PULMONARY NODULES: Primary | ICD-10-CM

## 2018-09-04 PROCEDURE — 99213 OFFICE O/P EST LOW 20 MIN: CPT | Performed by: INTERNAL MEDICINE

## 2018-09-04 PROCEDURE — 99212 OFFICE O/P EST SF 10 MIN: CPT | Performed by: INTERNAL MEDICINE

## 2018-09-04 RX ORDER — PANTOPRAZOLE SODIUM 40 MG/1
40 TABLET, DELAYED RELEASE ORAL
Qty: 30 TABLET | Refills: 5 | Status: SHIPPED | OUTPATIENT
Start: 2018-09-04 | End: 2019-08-16

## 2018-09-04 NOTE — TELEPHONE ENCOUNTER
Pt states symptoms have gotten worse. Pt states she is experiencing shortness of breath, nauseous, chest congestion and pressure throughout body.  Pt states \"I feel terrible I have never felt this way\" Please call thank you [phone]

## 2018-09-04 NOTE — PROGRESS NOTES
The patient is a 78-year-old female who I know well from prior evaluation comes in now for follow-up. She notes that she has nausea every morning.   She underwent bronchoscopy with endobronchial ultrasound and needle aspiration of mediastinal lymphadenopat consider pulmonary function testing. Recommendations: Repeat CT scan the chest 3 months after the last study, follow-up in 3 month interval.    2.  Nausea–etiology is uncertain.   I am going to screen for central abnormality with CT scan of the brain wit

## 2018-09-04 NOTE — TELEPHONE ENCOUNTER
Pt reports an ongoing persistent chronic cough (dry and productive), throat hurting on and off, chest heaviness and SOB on and off, no temperature, worsening since biopsy x 2 weeks ago. Pt repeated \"I feel terrible, I don't know what is going on but I have never felt this way before\". Pt offered appt today 9/4 @ 4:45 pm w/ PJC, pt accepted. Msg routed to [facility] as FYI.

## 2018-09-06 ENCOUNTER — TELEPHONE (OUTPATIENT)
Dept: PULMONOLOGY | Facility: CLINIC | Age: 23
End: 2018-09-06

## 2018-09-06 DIAGNOSIS — R06.02 SOB (SHORTNESS OF BREATH): ICD-10-CM

## 2018-09-06 DIAGNOSIS — R07.9 CHEST PAIN, UNSPECIFIED TYPE: Primary | ICD-10-CM

## 2018-09-06 DIAGNOSIS — J84.10 GRANULOMATOUS LUNG DISEASE (HCC): ICD-10-CM

## 2018-09-08 ENCOUNTER — HOSPITAL ENCOUNTER (OUTPATIENT)
Dept: CT IMAGING | Facility: HOSPITAL | Age: 23
Discharge: HOME | End: 2018-09-08
Attending: INTERNAL MEDICINE
Payer: COMMERCIAL

## 2018-09-08 DIAGNOSIS — R11.0 NAUSEA: ICD-10-CM

## 2018-09-08 LAB — CREAT BLD-MCNC: 0.7 MG/DL (ref 0.5–1.5)

## 2018-09-08 PROCEDURE — 70470 CT HEAD/BRAIN W/O & W/DYE: CPT | Performed by: INTERNAL MEDICINE

## 2018-09-08 PROCEDURE — 82565 ASSAY OF CREATININE: CPT

## 2018-09-10 ENCOUNTER — TELEPHONE (OUTPATIENT)
Dept: PULMONOLOGY | Facility: CLINIC | Age: 23
End: 2018-09-10

## 2018-09-11 ENCOUNTER — APPOINTMENT (OUTPATIENT)
Dept: CT IMAGING | Facility: HOSPITAL | Age: 23
End: 2018-09-11
Attending: EMERGENCY MEDICINE
Payer: COMMERCIAL

## 2018-09-11 ENCOUNTER — APPOINTMENT (OUTPATIENT)
Dept: GENERAL RADIOLOGY | Facility: HOSPITAL | Age: 23
End: 2018-09-11
Attending: EMERGENCY MEDICINE
Payer: COMMERCIAL

## 2018-09-11 ENCOUNTER — TELEPHONE (OUTPATIENT)
Dept: PULMONOLOGY | Facility: CLINIC | Age: 23
End: 2018-09-11

## 2018-09-11 ENCOUNTER — HOSPITAL ENCOUNTER (EMERGENCY)
Facility: HOSPITAL | Age: 23
Discharge: HOME | End: 2018-09-11
Attending: EMERGENCY MEDICINE
Payer: COMMERCIAL

## 2018-09-11 VITALS
DIASTOLIC BLOOD PRESSURE: 81 MMHG | RESPIRATION RATE: 16 BRPM | BODY MASS INDEX: 45.5 KG/M2 | SYSTOLIC BLOOD PRESSURE: 110 MMHG | HEART RATE: 96 BPM | TEMPERATURE: 99 F | HEIGHT: 67 IN | WEIGHT: 289.88 LBS | OXYGEN SATURATION: 99 %

## 2018-09-11 DIAGNOSIS — R91.8 PULMONARY NODULES: ICD-10-CM

## 2018-09-11 DIAGNOSIS — R07.9 CHEST PAIN OF UNCERTAIN ETIOLOGY: Primary | ICD-10-CM

## 2018-09-11 LAB
ANION GAP SERPL CALC-SCNC: 9 MMOL/L (ref 0–18)
BASOPHILS # BLD: 0.1 K/UL (ref 0–0.2)
BASOPHILS NFR BLD: 1 %
BILIRUB UR QL: NEGATIVE
BUN SERPL-MCNC: 12 MG/DL (ref 8–20)
BUN/CREAT SERPL: 13.3 (ref 10–20)
CALCIUM SERPL-MCNC: 9.1 MG/DL (ref 8.5–10.5)
CHLORIDE SERPL-SCNC: 104 MMOL/L (ref 95–110)
CO2 SERPL-SCNC: 23 MMOL/L (ref 22–32)
COLOR UR: YELLOW
CREAT SERPL-MCNC: 0.9 MG/DL (ref 0.5–1.5)
D DIMER PPP FEU-MCNC: 0.61 MCG/ML (ref ?–0.5)
EOSINOPHIL # BLD: 0.1 K/UL (ref 0–0.7)
EOSINOPHIL NFR BLD: 1 %
ERYTHROCYTE [DISTWIDTH] IN BLOOD BY AUTOMATED COUNT: 14.1 % (ref 11–15)
GLUCOSE SERPL-MCNC: 101 MG/DL (ref 70–99)
GLUCOSE UR-MCNC: NEGATIVE MG/DL
HCT VFR BLD AUTO: 40.1 % (ref 35–48)
HGB BLD-MCNC: 13.3 G/DL (ref 12–16)
HGB UR QL STRIP.AUTO: NEGATIVE
KETONES UR-MCNC: NEGATIVE MG/DL
LYMPHOCYTES # BLD: 2.8 K/UL (ref 1–4)
LYMPHOCYTES NFR BLD: 23 %
MCH RBC QN AUTO: 26.8 PG (ref 27–32)
MCHC RBC AUTO-ENTMCNC: 33.1 G/DL (ref 32–37)
MCV RBC AUTO: 81 FL (ref 80–100)
MONOCYTES # BLD: 0.9 K/UL (ref 0–1)
MONOCYTES NFR BLD: 7 %
NEUTROPHILS # BLD AUTO: 8.2 K/UL (ref 1.8–7.7)
NEUTROPHILS NFR BLD: 68 %
NITRITE UR QL STRIP.AUTO: NEGATIVE
OSMOLALITY UR CALC.SUM OF ELEC: 282 MOSM/KG (ref 275–295)
PH UR: 5 [PH] (ref 5–8)
PLATELET # BLD AUTO: 289 K/UL (ref 140–400)
PMV BLD AUTO: 9.8 FL (ref 7.4–10.3)
POTASSIUM SERPL-SCNC: 3.8 MMOL/L (ref 3.3–5.1)
PROT UR-MCNC: NEGATIVE MG/DL
RBC # BLD AUTO: 4.96 M/UL (ref 3.7–5.4)
RBC #/AREA URNS AUTO: 3 /HPF
SODIUM SERPL-SCNC: 136 MMOL/L (ref 136–144)
SP GR UR STRIP: 1.02 (ref 1–1.03)
TROPONIN I SERPL-MCNC: 0 NG/ML (ref ?–0.03)
UROBILINOGEN UR STRIP-ACNC: <2
VIT C UR-MCNC: NEGATIVE MG/DL
WBC # BLD AUTO: 12.1 K/UL (ref 4–11)
WBC #/AREA URNS AUTO: 9 /HPF

## 2018-09-11 PROCEDURE — 99285 EMERGENCY DEPT VISIT HI MDM: CPT

## 2018-09-11 PROCEDURE — 87086 URINE CULTURE/COLONY COUNT: CPT | Performed by: EMERGENCY MEDICINE

## 2018-09-11 PROCEDURE — 93010 ELECTROCARDIOGRAM REPORT: CPT | Performed by: EMERGENCY MEDICINE

## 2018-09-11 PROCEDURE — 85025 COMPLETE CBC W/AUTO DIFF WBC: CPT | Performed by: EMERGENCY MEDICINE

## 2018-09-11 PROCEDURE — 81001 URINALYSIS AUTO W/SCOPE: CPT | Performed by: EMERGENCY MEDICINE

## 2018-09-11 PROCEDURE — 71045 X-RAY EXAM CHEST 1 VIEW: CPT | Performed by: EMERGENCY MEDICINE

## 2018-09-11 PROCEDURE — 93005 ELECTROCARDIOGRAM TRACING: CPT

## 2018-09-11 PROCEDURE — 84484 ASSAY OF TROPONIN QUANT: CPT | Performed by: EMERGENCY MEDICINE

## 2018-09-11 PROCEDURE — 71260 CT THORAX DX C+: CPT | Performed by: EMERGENCY MEDICINE

## 2018-09-11 PROCEDURE — 80048 BASIC METABOLIC PNL TOTAL CA: CPT | Performed by: EMERGENCY MEDICINE

## 2018-09-11 PROCEDURE — 85379 FIBRIN DEGRADATION QUANT: CPT | Performed by: EMERGENCY MEDICINE

## 2018-09-11 PROCEDURE — 36415 COLL VENOUS BLD VENIPUNCTURE: CPT

## 2018-09-11 RX ORDER — DIAZEPAM 5 MG/1
5 TABLET ORAL ONCE
Status: COMPLETED | OUTPATIENT
Start: 2018-09-11 | End: 2018-09-11

## 2018-09-11 RX ORDER — HYDROCODONE BITARTRATE AND ACETAMINOPHEN 5; 325 MG/1; MG/1
1 TABLET ORAL EVERY 4 HOURS PRN
Qty: 10 TABLET | Refills: 0 | Status: SHIPPED | OUTPATIENT
Start: 2018-09-11 | End: 2018-09-18

## 2018-09-11 NOTE — TELEPHONE ENCOUNTER
Pt states her symptoms are worsening. Pt c/o bilateral chest pain and upper back pain 8/10 that was on and off for a couple of weeks when eating and drinking and now the pain is constant that started last night. Pt c/o a little SOB for months.   Pt states

## 2018-09-11 NOTE — ED INITIAL ASSESSMENT (HPI)
Patient here with c/o chest tightness started today and sob x 1 month. Sent by Dr. Andre Neighbor today. States she gets chest CT's every 3 months for pulmonary nodules.

## 2018-09-12 NOTE — ED NOTES
Pt resting comfortably on cart, family at bedside. VSS, patient denies needs or complaints at this time. Updated on plan of care. Will continue to monitor.

## 2018-09-12 NOTE — ED NOTES
Pt resting comfortably on cart, VSS, family at bedside. Updated on plan of care, denies needs or complaints at this time. Will continue to monitor.

## 2018-09-12 NOTE — ED PROVIDER NOTES
Patient Seen in: [facility] AND [facility] Emergency Department    History   Patient presents with:  Chest Pain Angina (cardiovascular)    Stated Complaint: chest pain and sob     HPI    69-year-old female presents for complaint of chest pain and shortness of br Oral   SpO2 98 %   O2 Device None (Room air)       Current:BP 92/76   Pulse 101   Temp 99.4 °F (37.4 °C) (Oral)   Resp 24   Ht 170.2 cm (5' 7\")   Wt 131.5 kg   SpO2 97%   BMI 45.41 kg/m²         Physical Exam   Constitutional: She is oriented to person, p WITH PLATELET    Narrative: The following orders were created for panel order CBC WITH DIFFERENTIAL WITH PLATELET.   Procedure                               Abnormality         Status                     ---------                               --------- 1. Normal examination. No significant change has occurred from August 20, 2014. Dictated by (CST): Mark Chauhan MD on 9/11/2018 at 19:07     Approved by (CST):  Mark Chauhan MD on 9/11/2018 at 19:08          Ct Chest Pain/pe (iv Only) Em    Re series 4 corresponding to coronal image 92 series 6. There is a semisolid nodule at the posterior left base, image 118 series 6 corresponding to axial image 96 series 4. VASCULATURE: No visible pulmonary arterial thrombus or attenuation.   THORACIC AORTA: N addressed and answered.                   Disposition and Plan     Clinical Impression:  Chest pain of uncertain etiology  (primary encounter diagnosis)  Pulmonary nodules    Disposition:  Discharge  9/11/2018  9:53 pm    Follow-up:  Joshua Barrientos MD  27

## 2018-09-17 ENCOUNTER — TELEPHONE (OUTPATIENT)
Dept: GASTROENTEROLOGY | Facility: CLINIC | Age: 23
End: 2018-09-17

## 2018-09-17 NOTE — TELEPHONE ENCOUNTER
LM for pt to offer appt w/ PB on 9/18/18 @ 11AM (pt added to provider schedule). She is to c/b to confirm the appt ASAP. CSS- please re-route to RN's after confirming appt with patient, thank you.

## 2018-09-17 NOTE — TELEPHONE ENCOUNTER
Spoke to pt and wanted to make sure it was OK to see PB, I reviewed that Dr. Altaf Sarabia is aware that she will be seeing PB and that it is OK as she has the soonest available appt at this time.  She verbalized understanding and confirmed the appt, directions adilene

## 2018-09-17 NOTE — TELEPHONE ENCOUNTER
Dr. Kayleen Bautista-    Pt recently in the ED on 9/11/18 for chest pain, SOB. Negative cardiac workup, possible esophageal spasms. Pt last seen for OV 10/13/2016, did not undergo CLN as recommended.     Please advise if this pt may be seen by PB or if pt may be seen so

## 2018-09-17 NOTE — TELEPHONE ENCOUNTER
Dr. Dayne Segovia, I can evaluate this patient - I reviewed her chart. Please advise if you are OK with me adding this patient to my schedule.

## 2018-09-17 NOTE — TELEPHONE ENCOUNTER
Pt returning call attempt to transfer rn unavailable pt confirmed she will make appt on date offered pt wants to speak to rn please call thank you

## 2018-09-18 ENCOUNTER — OFFICE VISIT (OUTPATIENT)
Dept: GASTROENTEROLOGY | Facility: CLINIC | Age: 23
End: 2018-09-18
Payer: COMMERCIAL

## 2018-09-18 ENCOUNTER — TELEPHONE (OUTPATIENT)
Dept: GASTROENTEROLOGY | Facility: CLINIC | Age: 23
End: 2018-09-18

## 2018-09-18 VITALS
HEIGHT: 67 IN | BODY MASS INDEX: 44.26 KG/M2 | WEIGHT: 282 LBS | SYSTOLIC BLOOD PRESSURE: 120 MMHG | DIASTOLIC BLOOD PRESSURE: 78 MMHG

## 2018-09-18 DIAGNOSIS — R13.10 DYSPHAGIA, UNSPECIFIED TYPE: ICD-10-CM

## 2018-09-18 DIAGNOSIS — R12 HEARTBURN: ICD-10-CM

## 2018-09-18 DIAGNOSIS — R19.5 LOOSE STOOLS: Primary | ICD-10-CM

## 2018-09-18 DIAGNOSIS — R19.4 ALTERED BOWEL HABITS: ICD-10-CM

## 2018-09-18 PROCEDURE — 99212 OFFICE O/P EST SF 10 MIN: CPT | Performed by: PHYSICIAN ASSISTANT

## 2018-09-18 PROCEDURE — 99214 OFFICE O/P EST MOD 30 MIN: CPT | Performed by: PHYSICIAN ASSISTANT

## 2018-09-18 NOTE — PATIENT INSTRUCTIONS
1. Schedule colonoscopy and upper endoscopy with Dr. Oscar Short with [facility] @ [address] [place] [address]. 2.  bowel prep from pharmacy - I have prescribed Trilyte split dose preparation. 3. Continue all medications for procedure    4.  Read all bowel prep instructions caref

## 2018-09-18 NOTE — PROGRESS NOTES
[facility] Follow-up Visit    [place] 6. Her father is doing better with his lymphoma diagnosis. Currently, Anton does not feel short of breath and she denies CP or palpitations. She is very tearful about her health and uncertainty currently.  She asks if HYDROcodone-acetaminophen 5-325 MG Oral Tab Take 1 tablet by mouth every 4 (four) hours as needed.  Disp: 10 tablet Rfl: 0   Pantoprazole Sodium (PROTONIX) 40 MG Oral Tab EC Take 1 tablet (40 mg total) by mouth every morning before breakfast. Disp: 30 tab see Dr. Debby Patino previous [address] for comprehensive details. ER labs (-) for anemia, (+) mild leukocytosis. CT PE (-). #Pulmonary Nodules: concern for sarcoidosis given PET scan + subsequent studies. Recommend follow up with Dr. Stephanie Mahoney.  Patient is having card Parasites Non-traveler Giardia + Crypto Antigen, Stool      Stool Culture W/ Shigatoxin      Meds This Visit:  Requested Prescriptions      No prescriptions requested or ordered in this encounter       Imaging & Referrals:  None     CC: Dr. Ivania Wright as Tai Ramirez

## 2018-09-18 NOTE — TELEPHONE ENCOUNTER
Yuly-    After scheduling this pt, she had additional questions for you about the anesthesia. She did not go into much detail on the questions; I told her I would let you know know and have you discuss with her. Thank you.

## 2018-09-18 NOTE — TELEPHONE ENCOUNTER
I left a message at the patient's preferred number to give us a call back.       The patient is to undergo MAC anesthesia for her procedures which is discussed with her in office due to her BMI and that we are doing both procedures and the same day with pos

## 2018-09-18 NOTE — TELEPHONE ENCOUNTER
Scheduled for:  Colonoscopy 93995 / EGD with poss DIL [id number]  Provider Name: Dr. Leesa Forbes  Date:  9/25/18  Location:  [place]  Sedation:  MAC  Time:  2:00 pm, arrival 1:00 pm  Prep: Delisa Shah  Meds/Allergies Reconciled?:  ANDRESSA Avalos reviewed  Diagnosis with codes:

## 2018-09-20 NOTE — TELEPHONE ENCOUNTER
Pt contacted and reviewed PB message below, she verbalized understanding. She states she wanted PB to know the followin. She becomes nauseated with anesthesia.  I reviewed that is a common s/e and she is to mention that to the anesthesiologist on d

## 2018-09-20 NOTE — TELEPHONE ENCOUNTER
Agree with triage recommendations to discuss with anesthesiologist on the day of the procedure. Noted that the patient's diarrhea has since resolved. I am awaiting the results of her pulmonology/cardiology examinations tomorrow 9/21.   If she develops

## 2018-09-21 ENCOUNTER — APPOINTMENT (OUTPATIENT)
Dept: RESPIRATORY THERAPY | Facility: HOSPITAL | Age: 23
End: 2018-09-21
Attending: INTERNAL MEDICINE
Payer: COMMERCIAL

## 2018-09-21 ENCOUNTER — HOSPITAL ENCOUNTER (OUTPATIENT)
Dept: CV DIAGNOSTICS | Facility: HOSPITAL | Age: 23
Discharge: HOME | End: 2018-09-21
Attending: INTERNAL MEDICINE
Payer: COMMERCIAL

## 2018-09-21 DIAGNOSIS — R07.9 CHEST PAIN, UNSPECIFIED TYPE: ICD-10-CM

## 2018-09-21 PROCEDURE — 93018 CV STRESS TEST I&R ONLY: CPT | Performed by: INTERNAL MEDICINE

## 2018-09-21 PROCEDURE — 93017 CV STRESS TEST TRACING ONLY: CPT | Performed by: INTERNAL MEDICINE

## 2018-09-21 PROCEDURE — 93016 CV STRESS TEST SUPVJ ONLY: CPT | Performed by: INTERNAL MEDICINE

## 2018-09-24 ENCOUNTER — TELEPHONE (OUTPATIENT)
Dept: GASTROENTEROLOGY | Facility: CLINIC | Age: 23
End: 2018-09-24

## 2018-09-24 DIAGNOSIS — R19.4 ALTERED BOWEL HABITS: ICD-10-CM

## 2018-09-24 DIAGNOSIS — R12 HEARTBURN: ICD-10-CM

## 2018-09-24 DIAGNOSIS — R19.5 LOOSE STOOLS: Primary | ICD-10-CM

## 2018-09-24 NOTE — TELEPHONE ENCOUNTER
Normal exercise ECG     I am unable to pull up stress test - not currently listed under the cardio section.     Dr. Antony Pichardo, in this patient would you recommend breathing tests prior to the bi-directional?

## 2018-09-24 NOTE — TELEPHONE ENCOUNTER
Pt contacted and reviewed Dr. Jeffery Naranjo message below, she verbalized understanding and will complete PFT's on Wed, 9/26/18. Once she is cleared, she may reschedule. Routed to GI schedulers- please reschedule pt, see TE from 9/18/18 for orders, thank you.

## 2018-09-24 NOTE — TELEPHONE ENCOUNTER
Pt needs to reschedule CLN for tomorrow - states the machine broke on Friday when she was getting pulmo test done and she needs to complete that before CLN

## 2018-09-24 NOTE — TELEPHONE ENCOUNTER
PB/ Dr. William Santiago-    Pt is cancelling CLN/EGD tomorrow w/ Dr. William Santiago d/t unable to have pulmonary breathing test done on 9/21/18, was able to do the stress test and ECG. Does she need to reschedule her procedures until this is completed?  Please advise, thank y

## 2018-09-24 NOTE — TELEPHONE ENCOUNTER
If she is symptomatic from pulmonary standpoint, then yes her pulmonary issues would need to be addressed and cleared before she can safely have EGD/CLN.

## 2018-09-26 ENCOUNTER — HOSPITAL ENCOUNTER (OUTPATIENT)
Dept: RESPIRATORY THERAPY | Facility: HOSPITAL | Age: 23
Discharge: HOME | End: 2018-09-26
Attending: INTERNAL MEDICINE
Payer: COMMERCIAL

## 2018-09-26 ENCOUNTER — TELEPHONE (OUTPATIENT)
Dept: PULMONOLOGY | Facility: CLINIC | Age: 23
End: 2018-09-26

## 2018-09-26 DIAGNOSIS — R06.02 SOB (SHORTNESS OF BREATH): ICD-10-CM

## 2018-09-26 DIAGNOSIS — J84.10 GRANULOMATOUS LUNG DISEASE (HCC): ICD-10-CM

## 2018-09-26 PROCEDURE — 94729 DIFFUSING CAPACITY: CPT | Performed by: INTERNAL MEDICINE

## 2018-09-26 PROCEDURE — 94726 PLETHYSMOGRAPHY LUNG VOLUMES: CPT | Performed by: INTERNAL MEDICINE

## 2018-09-26 PROCEDURE — 94060 EVALUATION OF WHEEZING: CPT | Performed by: INTERNAL MEDICINE

## 2018-09-26 NOTE — TELEPHONE ENCOUNTER
----- Message from Tawanna Geiger MD sent at 9/24/2018 10:27 PM CDT -----  RN, please call the patient to let her know her stress test was normal

## 2018-09-27 ENCOUNTER — APPOINTMENT (OUTPATIENT)
Dept: GENERAL RADIOLOGY | Facility: HOSPITAL | Age: 23
End: 2018-09-27
Attending: EMERGENCY MEDICINE
Payer: COMMERCIAL

## 2018-09-27 ENCOUNTER — HOSPITAL ENCOUNTER (EMERGENCY)
Facility: HOSPITAL | Age: 23
Discharge: HOME | End: 2018-09-27
Attending: EMERGENCY MEDICINE
Payer: COMMERCIAL

## 2018-09-27 ENCOUNTER — TELEPHONE (OUTPATIENT)
Dept: PULMONOLOGY | Facility: CLINIC | Age: 23
End: 2018-09-27

## 2018-09-27 VITALS
HEIGHT: 67 IN | SYSTOLIC BLOOD PRESSURE: 109 MMHG | HEART RATE: 77 BPM | TEMPERATURE: 98 F | OXYGEN SATURATION: 99 % | BODY MASS INDEX: 42.38 KG/M2 | WEIGHT: 270 LBS | RESPIRATION RATE: 22 BRPM | DIASTOLIC BLOOD PRESSURE: 61 MMHG

## 2018-09-27 DIAGNOSIS — R06.00 DYSPNEA, UNSPECIFIED TYPE: Primary | ICD-10-CM

## 2018-09-27 PROCEDURE — 71046 X-RAY EXAM CHEST 2 VIEWS: CPT | Performed by: EMERGENCY MEDICINE

## 2018-09-27 PROCEDURE — 99284 EMERGENCY DEPT VISIT MOD MDM: CPT

## 2018-09-27 RX ORDER — PANTOPRAZOLE SODIUM 40 MG/1
40 TABLET, DELAYED RELEASE ORAL
Status: DISCONTINUED | OUTPATIENT
Start: 2018-09-27 | End: 2018-09-27

## 2018-09-27 RX ORDER — PREDNISONE 20 MG/1
20 TABLET ORAL 2 TIMES DAILY
Qty: 12 TABLET | Refills: 0 | Status: SHIPPED | OUTPATIENT
Start: 2018-09-27 | End: 2018-09-30

## 2018-09-27 NOTE — TELEPHONE ENCOUNTER
Mimi Guerrero MD   You 5 minutes ago (3:28 PM)      L,  Yes the patient is cleared from my perspective to have EGD.  Pj (Routing comment)

## 2018-09-27 NOTE — TELEPHONE ENCOUNTER
RN,  The patient was advised to try pred short course and call in1 week or add on my schedule next week.

## 2018-09-27 NOTE — TELEPHONE ENCOUNTER
Pt stts she is @ [facility] ER now d/t dyspnea. She stts Dr. Ahsan Mock told her that she will be calling Dr. Michael Fields. Per pt she had CXR & is still waiting to find out if GI/Pulm related. Explained will inform MD & f/u re: results. Pt voiced understanding.  [facility]

## 2018-09-27 NOTE — ED NOTES
Assumed care from [employer]. Patient returned from xray, resting in bed. Call light at reach. Family is at bedside. Will continue to monitor.

## 2018-09-27 NOTE — TELEPHONE ENCOUNTER
SHANTA/ [address] to proceed with scheduling per Dr. Dalila Peralta message below? Please advise, thank you.

## 2018-09-27 NOTE — ED INITIAL ASSESSMENT (HPI)
SOB ONGOING FOR A FEW MONTHS NOW, HAD A PFT YESTERDAY AND A WORKUP LAST WEEK WITH DR MYLES, WAS AT [facility] 3 DAYS AGO FOR THE SAME CONDITION, PRESCRIBED XANAX WITHOUT RELIEF

## 2018-09-27 NOTE — TELEPHONE ENCOUNTER
Routed to GI schedulers as HP- please reschedule pt, see TE from 9/18/18 for orders, thank you. Needs to be seen sooner rather than later d/t her sxs, thank you.

## 2018-09-27 NOTE — TELEPHONE ENCOUNTER
SHANTA/Dr. Qiu/Dr. HALL- Does pt have clearance to schedule CLN/EGD? Please advise, thank you.     Dakota Hunter routed conversation to [facility] Gi Clinical Staff 19 minutes ago (8:52 AM)      Arthur Yan [facility] [phone]  Eva Mccauley 20 minutes ago (8:51 AM

## 2018-09-27 NOTE — TELEPHONE ENCOUNTER
Pt called to let [address] [place] [address] know that she had PFT done yesterday. She was told to call w/ update and also for results. Please call.

## 2018-09-27 NOTE — ED PROVIDER NOTES
Patient Seen in: [facility] Emergency Department    History   Patient presents with:  Dyspnea ASHLEY SOB (respiratory)    Stated Complaint: SOB    HPI    Patient is a 18-year-old female who presents with chronic shortness of breath times 2 months.   P above.    Physical Exam     ED Triage Vitals [09/27/18 1036]   /80   Pulse 78   Resp 26   Temp 97.8 °F (36.6 °C)   Temp src Tympanic   SpO2 100 %   O2 Device None (Room air)       Current:/61   Pulse 77   Temp 97.8 °F (36.6 °C) (Tympanic)   Res tablet (20 mg total) by mouth 2 (two) times daily for 3 days. Take twice a day for 3 days, then once a day for 6 days. , Print Script, Disp-12 tablet, R-0

## 2018-09-27 NOTE — TELEPHONE ENCOUNTER
Informed pt of Dr. Purvis See orders. She stts she has appt on 10/4. Explained PFT results not yet avail, but will f/u once they are & Dr. Mp Forman did clear her for EGD (see TE 9/24). She voiced understanding.

## 2018-09-28 NOTE — TELEPHONE ENCOUNTER
Scheduled for:  Colonoscopy [phone] and EGD 72057 w/poss Dil  Provider Name: Dr. Acevedo Rising  Date:  10/2/18  Location:  [facility]  Sedation:  MAC  Time:  6413 (pt is aware to arrive at 0915)   Prep:  Maurisio pt picking up new instructions on Saturday 9/29/18  Meds/All

## 2018-09-29 ENCOUNTER — TELEPHONE (OUTPATIENT)
Dept: GASTROENTEROLOGY | Facility: CLINIC | Age: 23
End: 2018-09-29

## 2018-09-29 NOTE — PROCEDURES
[facility]    Patient's Name Ronnell Quinn MRN [id number]    1995 Pulmonologist Silvia Conklin MD   Location [facility] PCP Ruth Sierra MD, MD     IMPRESSION:    The PFTs are Normal.    There is no

## 2018-09-29 NOTE — TELEPHONE ENCOUNTER
came to pickup prep instructions for procedure 10/2/18 I could not find her instructions.  Looking at encounter I gave  Martín Charanjit papers highlighted instructions on front page (2 days before and breakfast info) advised pt to call or come in on

## 2018-09-29 NOTE — ADDENDUM NOTE
Encounter addended by: Alli Sen MD on: 9/29/2018 11:54 AM   Actions taken: Sign clinical note, Charge Capture section accepted

## 2018-09-29 NOTE — TELEPHONE ENCOUNTER
Mr Mookie Grider paged me today Saturday on behalf of his wife as MD on call for further instructions on EGD and colonoscopy examinations scheduled for next week. Sounds like he has the colonoscopy instructions.   He had numerous questions about the prep and any

## 2018-10-01 ENCOUNTER — TELEPHONE (OUTPATIENT)
Dept: PULMONOLOGY | Facility: CLINIC | Age: 23
End: 2018-10-01

## 2018-10-01 RX ORDER — POLYETHYLENE GLYCOL 3350, SODIUM CHLORIDE, SODIUM BICARBONATE, POTASSIUM CHLORIDE 420; 11.2; 5.72; 1.48 G/4L; G/4L; G/4L; G/4L
POWDER, FOR SOLUTION ORAL
Qty: 1 BOTTLE | Refills: 0 | Status: ON HOLD | OUTPATIENT
Start: 2018-10-01 | End: 2018-10-02

## 2018-10-01 NOTE — TELEPHONE ENCOUNTER
Pt requesting script for prep kit sent to preferred pharmacy. Pt has CLN/EGD scheduled for tomorrow. Please call pt once script has been sent to pharmacy.  Ph: [phone] you

## 2018-10-01 NOTE — TELEPHONE ENCOUNTER
I spoke with this patient this AM to inform her of the times to take her 2nd dose and to discuss her instructions which she verbally understood.

## 2018-10-01 NOTE — TELEPHONE ENCOUNTER
I spoke to the pt and she states she spoke to [facility], [address] this morning and all of her questions were answered.  No further questions at this time

## 2018-10-01 NOTE — TELEPHONE ENCOUNTER
----- Message from Hannah Traore MD sent at 9/30/2018 10:18 PM CDT -----  RN, please let the patient know that her PFTs are normal.

## 2018-10-02 ENCOUNTER — ANESTHESIA EVENT (OUTPATIENT)
Dept: ENDOSCOPY | Facility: HOSPITAL | Age: 23
End: 2018-10-02

## 2018-10-02 ENCOUNTER — ANESTHESIA (OUTPATIENT)
Dept: ENDOSCOPY | Facility: HOSPITAL | Age: 23
End: 2018-10-02

## 2018-10-02 ENCOUNTER — TELEPHONE (OUTPATIENT)
Dept: GASTROENTEROLOGY | Facility: CLINIC | Age: 23
End: 2018-10-02

## 2018-10-02 ENCOUNTER — HOSPITAL ENCOUNTER (OUTPATIENT)
Facility: HOSPITAL | Age: 23
Setting detail: HOSPITAL OUTPATIENT SURGERY
Discharge: HOME | End: 2018-10-02
Attending: INTERNAL MEDICINE | Admitting: INTERNAL MEDICINE

## 2018-10-02 VITALS
RESPIRATION RATE: 24 BRPM | BODY MASS INDEX: 42.38 KG/M2 | WEIGHT: 270 LBS | OXYGEN SATURATION: 99 % | HEIGHT: 67 IN | HEART RATE: 90 BPM | DIASTOLIC BLOOD PRESSURE: 63 MMHG | SYSTOLIC BLOOD PRESSURE: 107 MMHG

## 2018-10-02 DIAGNOSIS — R19.5 LOOSE STOOLS: ICD-10-CM

## 2018-10-02 DIAGNOSIS — R19.4 ALTERED BOWEL HABITS: ICD-10-CM

## 2018-10-02 DIAGNOSIS — R12 HEARTBURN: ICD-10-CM

## 2018-10-02 PROCEDURE — 0DB98ZX EXCISION OF DUODENUM, VIA NATURAL OR ARTIFICIAL OPENING ENDOSCOPIC, DIAGNOSTIC: ICD-10-PCS | Performed by: INTERNAL MEDICINE

## 2018-10-02 PROCEDURE — 43239 EGD BIOPSY SINGLE/MULTIPLE: CPT | Performed by: INTERNAL MEDICINE

## 2018-10-02 PROCEDURE — 0DB68ZX EXCISION OF STOMACH, VIA NATURAL OR ARTIFICIAL OPENING ENDOSCOPIC, DIAGNOSTIC: ICD-10-PCS | Performed by: INTERNAL MEDICINE

## 2018-10-02 PROCEDURE — 0DB18ZX EXCISION OF UPPER ESOPHAGUS, VIA NATURAL OR ARTIFICIAL OPENING ENDOSCOPIC, DIAGNOSTIC: ICD-10-PCS | Performed by: INTERNAL MEDICINE

## 2018-10-02 PROCEDURE — 45385 COLONOSCOPY W/LESION REMOVAL: CPT | Performed by: INTERNAL MEDICINE

## 2018-10-02 PROCEDURE — 0DBE8ZX EXCISION OF LARGE INTESTINE, VIA NATURAL OR ARTIFICIAL OPENING ENDOSCOPIC, DIAGNOSTIC: ICD-10-PCS | Performed by: INTERNAL MEDICINE

## 2018-10-02 PROCEDURE — 0DBP8ZX EXCISION OF RECTUM, VIA NATURAL OR ARTIFICIAL OPENING ENDOSCOPIC, DIAGNOSTIC: ICD-10-PCS | Performed by: INTERNAL MEDICINE

## 2018-10-02 PROCEDURE — 45380 COLONOSCOPY AND BIOPSY: CPT | Performed by: INTERNAL MEDICINE

## 2018-10-02 RX ORDER — MULTIVITAMIN
TABLET ORAL
COMMUNITY
End: 2020-12-04

## 2018-10-02 RX ORDER — SODIUM CHLORIDE, SODIUM LACTATE, POTASSIUM CHLORIDE, CALCIUM CHLORIDE 600; 310; 30; 20 MG/100ML; MG/100ML; MG/100ML; MG/100ML
INJECTION, SOLUTION INTRAVENOUS CONTINUOUS
Status: DISCONTINUED | OUTPATIENT
Start: 2018-10-02 | End: 2018-10-02

## 2018-10-02 RX ORDER — LIDOCAINE HYDROCHLORIDE 10 MG/ML
INJECTION, SOLUTION EPIDURAL; INFILTRATION; INTRACAUDAL; PERINEURAL AS NEEDED
Status: DISCONTINUED | OUTPATIENT
Start: 2018-10-02 | End: 2018-10-02 | Stop reason: SURG

## 2018-10-02 RX ADMIN — SODIUM CHLORIDE, SODIUM LACTATE, POTASSIUM CHLORIDE, CALCIUM CHLORIDE: 600; 310; 30; 20 INJECTION, SOLUTION INTRAVENOUS at 10:09:00

## 2018-10-02 RX ADMIN — LIDOCAINE HYDROCHLORIDE 25 MG: 10 INJECTION, SOLUTION EPIDURAL; INFILTRATION; INTRACAUDAL; PERINEURAL at 10:14:00

## 2018-10-02 RX ADMIN — SODIUM CHLORIDE, SODIUM LACTATE, POTASSIUM CHLORIDE, CALCIUM CHLORIDE: 600; 310; 30; 20 INJECTION, SOLUTION INTRAVENOUS at 10:45:00

## 2018-10-02 NOTE — OPERATIVE REPORT
ESOPHAGOGASTRODUODENOSCOPY (EGD) & COLONOSCOPY REPORT    Anton Gilpatsy Salazar     1995 Age 20year old   PCP Josue Lin MD, MD Endoscopist Elias Almendarez MD     Date of procedure: 10/02/18    Procedure: EGD w/cold biopsy & Colonoscopy w/col from the patient who tolerated the procedure well. Complications: None    EGD findings:      1. Esophagus: The squamocolumnar junction was noted at 36 cm and appeared regular. The GE junction was noted at 36 cm from the incisors.  The esophageal mucosa results either by phone or letter within 2 weeks, please call our office at [phone].     Specimens:  Gastric, duodenal, esophageal, colon

## 2018-10-02 NOTE — TELEPHONE ENCOUNTER
Informed pt of Dr. Tammy Villasenor orders. Reassured her that she may further d/w MD during upcoming appt on 10/4. Pt voiced understanding.

## 2018-10-02 NOTE — ANESTHESIA PREPROCEDURE EVALUATION
Anesthesia PreOp Note    HPI:     Atif Verma is a 20year old female who presents for preoperative consultation requested by: Kristopher Kraft MD    Date of Surgery: 10/2/2018    Procedure(s):  COLONOSCOPY  ESOPHAGOGASTRODUODENOSCOPY (EGD)  [facility] facility-administered medications on file. No current [facility]-ordered outpatient medications on file.       Amoxicillin             HIVES    Family History   Problem Relation Age of Onset   • Diabetes Father    • Cancer Father 59        Lymphoma    • Lipids Results   Component Value Date    WBC 12.1 (H) 09/11/2018    RBC 4.96 09/11/2018    HGB 13.3 09/11/2018    HCT 40.1 09/11/2018    MCV 81.0 09/11/2018    MCH 26.8 (L) 09/11/2018    MCHC 33.1 09/11/2018    RDW 14.1 09/11/2018     09/11/2018    MPV 9.8

## 2018-10-02 NOTE — TELEPHONE ENCOUNTER
Balwinder Edouard MD  P [facility] Gi Clinical Staff             GI staff: please place recall for colonoscopy in 5 years      Letter mailed out to pt today. Colon recall entered for 5 years. Snapshot updated.

## 2018-10-02 NOTE — H&P
History & Physical Examination    Patient Name: Anastasiia Watson  MRN: [id number]  [facility]: [id number]  YOB: 1995    Diagnosis: GERD, SOb, change in bowels      Medications Prior to Admission:  Cholecalciferol (VITAMIN D) 1000 units Oral Tab Take Used    Alcohol use: No      Alcohol/week: 0.0 oz      Comment: None      SYSTEM Check if Physical Exam is Normal If not normal, please explain:   HESEAN [ Jordan Perez  [ X]    HEART [ Vanesa Guthrie [ Felicita Albarraning [ Trupti Castro [ X]      I have discusse

## 2018-10-02 NOTE — ANESTHESIA POSTPROCEDURE EVALUATION
Patient: Carol Dyer    Procedure Summary     Date:  10/02/18 Room / Location:  [facility] ENDOSCOPY 05 / [facility] ENDOSCOPY    Anesthesia Start:  1009 Anesthesia Stop:  0272    Procedures:       COLONOSCOPY (N/A )      ESOPHAGOGASTRODUODENOSCOPY (EGD) (N/A ) Di

## 2018-10-04 ENCOUNTER — TELEPHONE (OUTPATIENT)
Dept: PULMONOLOGY | Facility: CLINIC | Age: 23
End: 2018-10-04

## 2018-10-04 ENCOUNTER — OFFICE VISIT (OUTPATIENT)
Dept: PULMONOLOGY | Facility: CLINIC | Age: 23
End: 2018-10-04
Payer: COMMERCIAL

## 2018-10-04 VITALS
BODY MASS INDEX: 43 KG/M2 | SYSTOLIC BLOOD PRESSURE: 115 MMHG | DIASTOLIC BLOOD PRESSURE: 76 MMHG | OXYGEN SATURATION: 99 % | HEIGHT: 67 IN | RESPIRATION RATE: 18 BRPM | HEART RATE: 73 BPM | WEIGHT: 274 LBS

## 2018-10-04 DIAGNOSIS — J84.10 GRANULOMATOUS LUNG DISEASE (HCC): Primary | ICD-10-CM

## 2018-10-04 PROCEDURE — 99213 OFFICE O/P EST LOW 20 MIN: CPT | Performed by: INTERNAL MEDICINE

## 2018-10-04 PROCEDURE — 99212 OFFICE O/P EST SF 10 MIN: CPT | Performed by: INTERNAL MEDICINE

## 2018-10-04 RX ORDER — ALPRAZOLAM 0.5 MG/1
0.5 TABLET ORAL 3 TIMES DAILY PRN
Qty: 60 TABLET | Refills: 3 | Status: SHIPPED | OUTPATIENT
Start: 2018-10-04 | End: 2019-08-16

## 2018-10-04 NOTE — PROGRESS NOTES
The patient is a 75-year-old female who I know well from prior evaluation of comes in now for follow-up. She has granulomatous lung disease.   She has been seen in the emergency room on multiple occasions now with dyspnea and some component of panic and wa

## 2018-10-04 NOTE — TELEPHONE ENCOUNTER
Short term disability forms dropped off at , fee paid, pt wants paperwork faxed to number on paperwork.  Papers faxed to [employer]

## 2018-10-05 NOTE — TELEPHONE ENCOUNTER
[employer] Disability form for Dr. Messi Rivera received in [employer] Signed release, billed $25. Logged for processing.  NK

## 2018-10-19 NOTE — TELEPHONE ENCOUNTER
Dr. Stephanie Mahoney,    Pt has been off work since 9/25/18 til pending the next eval with you on 11/15/18. Please sign off on form if you approve:  -Highlight the patient and hit \"Chart\" button.   -In Chart Review, w/in the Encounter tab - click 1 time on the Te

## 2018-10-26 ENCOUNTER — TELEPHONE (OUTPATIENT)
Dept: PULMONOLOGY | Facility: CLINIC | Age: 23
End: 2018-10-26

## 2018-10-26 NOTE — TELEPHONE ENCOUNTER
Pt had CT Pain/PE 9/11/18 pt in chronic calendar for repeat CT Chest for November 2018, PJC please advise if CT Chest should be done in November or further out.

## 2018-11-02 NOTE — TELEPHONE ENCOUNTER
An RQI number through [facility] is not required for this patient as long as the test is performed at an [facility]

## 2019-02-15 ENCOUNTER — TELEPHONE (OUTPATIENT)
Dept: PULMONOLOGY | Facility: CLINIC | Age: 24
End: 2019-02-15

## 2019-02-15 NOTE — TELEPHONE ENCOUNTER
Dr. Beal Man, patient is overdue for chest CT ordered on 9/4/18. However she had Chest CT in ER on 9/11/18. Is this CT sufficient or would you like her to proceed with CT ordered earlier? Thanks.

## 2019-02-17 NOTE — TELEPHONE ENCOUNTER
Yes the patient should get a follow-up CT scan of the chest.  Next month would be fine, 6 months after the last test

## 2019-02-18 NOTE — TELEPHONE ENCOUNTER
OK noted. Overdue letter was sent last month. Called the patient. She answered. Reminded her that she is due for her next chest CT scan within the next month. Pt states she has the order and scheduling information and will call to schedule.

## 2019-03-05 ENCOUNTER — TELEPHONE (OUTPATIENT)
Dept: GASTROENTEROLOGY | Facility: CLINIC | Age: 24
End: 2019-03-05

## 2019-03-05 NOTE — TELEPHONE ENCOUNTER
Pt indicates she has discomfort like acid buildup with constant burping. Pt tere first available 4/1/19, would like recommendations until appt, pls call at:[phone],thanks.

## 2019-03-05 NOTE — TELEPHONE ENCOUNTER
Pt contacted and reviewed Dr. Elsa Coronel message below, she verbalized understanding of all and did not have further questions or concerns at this time.

## 2019-03-05 NOTE — TELEPHONE ENCOUNTER
Pt contacted and she states for the last 2.5 wks she resumed taking pantoprazole 40mg/daily d/t her uncontrolled GERD sxs.      States she has tried to modify her diet, take the pantoprazole daily, and avoids spicy, greasy, fried foods, alcohol, dairy produ

## 2019-03-11 ENCOUNTER — OFFICE VISIT (OUTPATIENT)
Dept: GASTROENTEROLOGY | Facility: CLINIC | Age: 24
End: 2019-03-11
Payer: COMMERCIAL

## 2019-03-11 VITALS
WEIGHT: 270 LBS | SYSTOLIC BLOOD PRESSURE: 106 MMHG | BODY MASS INDEX: 42.38 KG/M2 | HEART RATE: 76 BPM | HEIGHT: 67 IN | DIASTOLIC BLOOD PRESSURE: 69 MMHG

## 2019-03-11 DIAGNOSIS — K21.9 GASTROESOPHAGEAL REFLUX DISEASE WITHOUT ESOPHAGITIS: Primary | ICD-10-CM

## 2019-03-11 DIAGNOSIS — R07.89 FUNCTIONAL CHEST PAIN: ICD-10-CM

## 2019-03-11 PROCEDURE — 99214 OFFICE O/P EST MOD 30 MIN: CPT | Performed by: INTERNAL MEDICINE

## 2019-03-11 PROCEDURE — 99212 OFFICE O/P EST SF 10 MIN: CPT | Performed by: INTERNAL MEDICINE

## 2019-03-11 RX ORDER — NORTRIPTYLINE HYDROCHLORIDE 10 MG/1
CAPSULE ORAL
Qty: 90 CAPSULE | Refills: 0 | OUTPATIENT
Start: 2019-03-11

## 2019-03-11 RX ORDER — NORTRIPTYLINE HYDROCHLORIDE 10 MG/1
10 CAPSULE ORAL NIGHTLY
Qty: 30 CAPSULE | Refills: 0 | Status: SHIPPED | OUTPATIENT
Start: 2019-03-11 | End: 2019-08-16

## 2019-03-11 NOTE — PATIENT INSTRUCTIONS
1. Take Pamelor 10mg at night  2. Call me in 2 weeks, we will need to increase dose  3. To get help with weight loss, I recommend seeing Dr. Veronica Novoa at [facility]. Please call Phone: [phone] to set up appointment.

## 2019-03-11 NOTE — TELEPHONE ENCOUNTER
Requested Prescriptions     Pending Prescriptions Disp Refills   • NORTRIPTYLINE HCL 10 MG Oral Cap [Pharmacy Med Name: NORTRIPTYLINE 10MG CAPSULES] 90 capsule 0     Sig: TAKE 1 CAPSULE(10 MG) BY MOUTH EVERY NIGHT     I called and spoke to [employer]

## 2019-03-11 NOTE — PROGRESS NOTES
[facility] Follow-up Visit    Yanique Past Medical History:   Diagnosis Date   • Anxiety state    • Colon polyp 10/2018    repeat CLN in 5 years   • High cholesterol    • Polycystic ovaries    • PONV (postoperative nausea and vomiting)    • Shortness of breath    • Visual impairment tablet (4 mg total) by mouth every 8 (eight) hours as needed.  Disp: 6 tablet Rfl: 0       Allergies:    Amoxicillin             HIVES    ROS:   CONSTITUTIONAL:  negative for fevers, chills  EYES:  negative for change in vision  RESPIRATORY:  negative for therefore there may be a musculoskeletal component. PPI therapy has never resulted in any improvement of her symptoms. She had no endoscopic esophagitis on upper endoscopy. No concerning esophageal lesion seen.   She does have some pulmonary nodules but

## 2019-03-14 PROBLEM — R07.89: Status: ACTIVE | Noted: 2019-03-14

## 2019-05-02 ENCOUNTER — HOSPITAL ENCOUNTER (EMERGENCY)
Facility: HOSPITAL | Age: 24
Discharge: HOME | End: 2019-05-02
Attending: EMERGENCY MEDICINE
Payer: COMMERCIAL

## 2019-05-02 VITALS
HEART RATE: 75 BPM | BODY MASS INDEX: 39.24 KG/M2 | DIASTOLIC BLOOD PRESSURE: 67 MMHG | TEMPERATURE: 98 F | RESPIRATION RATE: 14 BRPM | OXYGEN SATURATION: 97 % | WEIGHT: 250 LBS | SYSTOLIC BLOOD PRESSURE: 113 MMHG | HEIGHT: 67 IN

## 2019-05-02 DIAGNOSIS — T30.0 THERMAL BURN: Primary | ICD-10-CM

## 2019-05-02 PROCEDURE — 99283 EMERGENCY DEPT VISIT LOW MDM: CPT | Performed by: EMERGENCY MEDICINE

## 2019-05-02 RX ORDER — TRAMADOL HYDROCHLORIDE 50 MG/1
50 TABLET ORAL EVERY 6 HOURS PRN
Qty: 10 TABLET | Refills: 0 | Status: SHIPPED | OUTPATIENT
Start: 2019-05-02 | End: 2019-05-09

## 2019-05-02 NOTE — ED PROVIDER NOTES
Patient Seen in: [facility] AND [facility] Emergency Department    History   No chief complaint on file. Stated Complaint: Burn    HPI    18-year-old female with history of anxiety, hyperlipidemia, here with complaints of burn to the back for the past day.   O Gastrointestinal: Negative for abdominal pain, diarrhea, nausea and vomiting. Genitourinary: Negative for dysuria, flank pain and frequency. Musculoskeletal: Negative for back pain. Skin: Positive for wound. Negative for rash.    Neurological: Negativ Psychiatric: She has a normal mood and affect. Nursing note and vitals reviewed. ED Course     Pulse Oximeter:  Pulse oximetry on room air is 97%, indicating adequate oxygenation.     PROCEDURES:  none    DIAGNOSTICS:   Labs:  No results found for START taking these medications    traMADol HCl 50 MG Oral Tab  Take 1 tablet (50 mg total) by mouth every 6 (six) hours as needed for Pain.   Qty: 10 tablet Refills: 0

## 2019-05-07 ENCOUNTER — OFFICE VISIT (OUTPATIENT)
Dept: OBGYN CLINIC | Facility: CLINIC | Age: 24
End: 2019-05-07
Payer: COMMERCIAL

## 2019-05-07 VITALS
BODY MASS INDEX: 41.85 KG/M2 | HEART RATE: 76 BPM | SYSTOLIC BLOOD PRESSURE: 107 MMHG | HEIGHT: 67 IN | WEIGHT: 266.63 LBS | DIASTOLIC BLOOD PRESSURE: 74 MMHG

## 2019-05-07 DIAGNOSIS — Z01.419 ENCOUNTER FOR GYNECOLOGICAL EXAMINATION WITHOUT ABNORMAL FINDING: Primary | ICD-10-CM

## 2019-05-07 DIAGNOSIS — Z32.00 PREGNANCY EXAMINATION OR TEST, PREGNANCY UNCONFIRMED: ICD-10-CM

## 2019-05-07 DIAGNOSIS — Z30.41 ENCOUNTER FOR BIRTH CONTROL PILLS MAINTENANCE: ICD-10-CM

## 2019-05-07 DIAGNOSIS — E28.2 PCOS (POLYCYSTIC OVARIAN SYNDROME): ICD-10-CM

## 2019-05-07 PROCEDURE — 81025 URINE PREGNANCY TEST: CPT | Performed by: OBSTETRICS & GYNECOLOGY

## 2019-05-07 PROCEDURE — 99395 PREV VISIT EST AGE 18-39: CPT | Performed by: OBSTETRICS & GYNECOLOGY

## 2019-05-07 RX ORDER — ACETAMINOPHEN AND CODEINE PHOSPHATE 120; 12 MG/5ML; MG/5ML
0.35 SOLUTION ORAL DAILY
Qty: 1 PACKAGE | Refills: 11 | Status: SHIPPED | OUTPATIENT
Start: 2019-05-07 | End: 2020-12-04

## 2019-05-07 NOTE — PROGRESS NOTES
Well Woman Exam    HPI:  The patient is a 22yo female who presents for annual exam. She is doing well. She did receive counseling and is doing well with it. She is also on a weight loss plan that she is very excited about.  She is sexually active with her h file      Food insecurity:        Worry: Not on file        Inability: Not on file      Transportation needs:        Medical: Not on file        Non-medical: Not on file    Tobacco Use      Smoking status: Never Smoker      Smokeless tobacco: Never Used • Cancer Father 59        Lymphoma    • Lipids Brother         hyperlipidemia   • Other (Hyperlipidemia) Brother    • Diabetes Other        MEDICATIONS:    Current Outpatient Medications:   •  Norethindrone (ORTHO MICRONOR) 0.35 MG Oral Tab, Take 1 table developed, well nourished  Head/Face: normocephalic  Neck/Thyroid: thyroid symmetric, no thyromegaly, no nodules, no adenopathy  Heart: Regular rate and rhythm   Lungs: clear to ascultation bilaterally   Lymphatic:no abnormal supraclavicular or axillary ad

## 2019-05-16 ENCOUNTER — HOSPITAL ENCOUNTER (EMERGENCY)
Facility: HOSPITAL | Age: 24
Discharge: HOME | End: 2019-05-16
Attending: EMERGENCY MEDICINE
Payer: COMMERCIAL

## 2019-05-16 VITALS
BODY MASS INDEX: 39.24 KG/M2 | RESPIRATION RATE: 18 BRPM | WEIGHT: 250 LBS | OXYGEN SATURATION: 97 % | HEART RATE: 80 BPM | TEMPERATURE: 98 F | HEIGHT: 67 IN | SYSTOLIC BLOOD PRESSURE: 136 MMHG | DIASTOLIC BLOOD PRESSURE: 101 MMHG

## 2019-05-16 DIAGNOSIS — R07.0 THROAT PAIN IN ADULT: Primary | ICD-10-CM

## 2019-05-16 PROCEDURE — 99283 EMERGENCY DEPT VISIT LOW MDM: CPT

## 2019-05-16 RX ORDER — PANTOPRAZOLE SODIUM 40 MG/1
40 TABLET, DELAYED RELEASE ORAL ONCE
Status: COMPLETED | OUTPATIENT
Start: 2019-05-16 | End: 2019-05-16

## 2019-05-17 NOTE — ED INITIAL ASSESSMENT (HPI)
Sob and throat tightness for the past 2 hours. Seen ENT, referred by pulmonologist for fausto nodules, and told to come to ER if she feels tightness in her throat. Aaox4, speaking in full sentences in triage.

## 2019-05-17 NOTE — ED PROVIDER NOTES
Patient Seen in: [facility] Emergency Department    History   Patient presents with:  Throat Problem  Dyspnea ASHLEY SOB (respiratory)      HPI    Patient presents to the ED complaining of shortness of breath throat tightness and dizziness for the pa • Other (Hyperlipidemia) Brother    • Diabetes Other        Smoking Status: Social History    Socioeconomic History      Marital status:       Spouse name: Not on file      Number of children: Not on file      Years of education: Not on file time.   Skin: Skin is warm and dry. Psychiatric: She has a normal mood and affect. Her behavior is normal.   Nursing note and vitals reviewed.       ED Course      Labs Reviewed - No data to display      Imaging Results Available and Reviewed while in ED: (primary encounter diagnosis)    Disposition:  Discharge    Follow-up:  Rosemary Hoang MD [address]  [phone]  Call  As needed      Medications Prescribed:  Discharge Medication List as of 5/16/2019

## 2019-08-16 ENCOUNTER — OFFICE VISIT (OUTPATIENT)
Dept: OBGYN CLINIC | Facility: CLINIC | Age: 24
End: 2019-08-16
Payer: COMMERCIAL

## 2019-08-16 VITALS
SYSTOLIC BLOOD PRESSURE: 109 MMHG | DIASTOLIC BLOOD PRESSURE: 74 MMHG | BODY MASS INDEX: 43 KG/M2 | HEART RATE: 69 BPM | WEIGHT: 272.19 LBS

## 2019-08-16 DIAGNOSIS — N93.9 ABNORMAL UTERINE BLEEDING (AUB): Primary | ICD-10-CM

## 2019-08-16 DIAGNOSIS — E28.2 PCOS (POLYCYSTIC OVARIAN SYNDROME): ICD-10-CM

## 2019-08-16 PROCEDURE — 99213 OFFICE O/P EST LOW 20 MIN: CPT | Performed by: OBSTETRICS & GYNECOLOGY

## 2019-08-16 RX ORDER — ACETAMINOPHEN AND CODEINE PHOSPHATE 120; 12 MG/5ML; MG/5ML
0.35 SOLUTION ORAL DAILY
Qty: 3 PACKAGE | Refills: 4 | Status: SHIPPED | OUTPATIENT
Start: 2019-08-16 | End: 2020-12-04

## 2019-08-19 NOTE — PROGRESS NOTES
Evelio Knight is a 30year old female  Patient's last menstrual period was 2019. Patient presents with: Follow - Up: Pt here for for f/u on OCP. pt /o no menses since starting the pill. Patient presents today for AUB.  Pt has known hist TONSILLECTOMY  07/2016       SOCIAL HISTORY:  Social History    Socioeconomic History      Marital status:       Spouse name: Not on file      Number of children: Not on file      Years of education: Not on file      Highest education level: Not on Self-Exams: Not Asked        Grew up on a farm: Not Asked        History of tanning: Not Asked        Outdoor occupation: Not Asked        Pt has a pacemaker: No        Pt has a defibrillator: No        Breast feeding: Not Asked        Reaction to local a daily.       Plan:  1. Plan for TVUS to assess the uterine lining and how thick it is. Reviewed possible provera if patient has a thickened lining. Reviewed POP to continue if not a thick lining.    2. Pt to get clearance from PCP or Pulm stating ok to plac

## 2019-10-01 ENCOUNTER — HOSPITAL ENCOUNTER (EMERGENCY)
Facility: HOSPITAL | Age: 24
Discharge: HOME | End: 2019-10-01
Attending: EMERGENCY MEDICINE
Payer: COMMERCIAL

## 2019-10-01 VITALS
DIASTOLIC BLOOD PRESSURE: 71 MMHG | WEIGHT: 250 LBS | HEART RATE: 98 BPM | HEIGHT: 67 IN | RESPIRATION RATE: 18 BRPM | OXYGEN SATURATION: 98 % | TEMPERATURE: 98 F | BODY MASS INDEX: 39.24 KG/M2 | SYSTOLIC BLOOD PRESSURE: 132 MMHG

## 2019-10-01 DIAGNOSIS — J02.9 VIRAL PHARYNGITIS: Primary | ICD-10-CM

## 2019-10-01 PROCEDURE — 99282 EMERGENCY DEPT VISIT SF MDM: CPT

## 2019-10-01 PROCEDURE — 87430 STREP A AG IA: CPT

## 2019-10-01 RX ORDER — ACETAMINOPHEN 325 MG/1
650 TABLET ORAL EVERY 6 HOURS PRN
Qty: 30 TABLET | Refills: 0 | Status: SHIPPED | OUTPATIENT
Start: 2019-10-01 | End: 2020-12-04

## 2019-10-01 RX ORDER — IBUPROFEN 600 MG/1
600 TABLET ORAL EVERY 8 HOURS PRN
Qty: 30 TABLET | Refills: 0 | Status: SHIPPED | OUTPATIENT
Start: 2019-10-01 | End: 2019-10-08

## 2019-10-02 NOTE — ED PROVIDER NOTES
Patient Seen in: [facility] AND [facility] Emergency Department      History   Patient presents with:  Sore Throat    Stated Complaint: sore throat    HPI    49-year-old female with past medical history significant for anxiety, high cholesterol, polycystic ovari Ht 170.2 cm (5' 7\")   Wt 113.4 kg   SpO2 98%   BMI 39.16 kg/m²         Physical Exam    Physical Exam   Constitutional: AAOx3, well nourished, NAD  Head: Normocephalic and atraumatic.    Ears: TM's clear b/l  Nose: Nose normal.   Mouth/Throat: MMM, post OP

## 2019-10-03 ENCOUNTER — HOSPITAL ENCOUNTER (EMERGENCY)
Facility: HOSPITAL | Age: 24
Discharge: HOME | End: 2019-10-03
Attending: EMERGENCY MEDICINE
Payer: COMMERCIAL

## 2019-10-03 VITALS
SYSTOLIC BLOOD PRESSURE: 128 MMHG | DIASTOLIC BLOOD PRESSURE: 80 MMHG | TEMPERATURE: 99 F | OXYGEN SATURATION: 98 % | RESPIRATION RATE: 20 BRPM | HEART RATE: 99 BPM

## 2019-10-03 DIAGNOSIS — B34.9 VIRAL SYNDROME: Primary | ICD-10-CM

## 2019-10-03 PROCEDURE — 96374 THER/PROPH/DIAG INJ IV PUSH: CPT

## 2019-10-03 PROCEDURE — 96361 HYDRATE IV INFUSION ADD-ON: CPT

## 2019-10-03 PROCEDURE — 85025 COMPLETE CBC W/AUTO DIFF WBC: CPT | Performed by: EMERGENCY MEDICINE

## 2019-10-03 PROCEDURE — 99284 EMERGENCY DEPT VISIT MOD MDM: CPT

## 2019-10-03 PROCEDURE — 80048 BASIC METABOLIC PNL TOTAL CA: CPT | Performed by: EMERGENCY MEDICINE

## 2019-10-03 RX ORDER — DEXAMETHASONE SODIUM PHOSPHATE 4 MG/ML
8 INJECTION, SOLUTION INTRA-ARTICULAR; INTRALESIONAL; INTRAMUSCULAR; INTRAVENOUS; SOFT TISSUE ONCE
Status: COMPLETED | OUTPATIENT
Start: 2019-10-03 | End: 2019-10-03

## 2019-10-03 RX ORDER — KETOROLAC TROMETHAMINE 30 MG/ML
30 INJECTION, SOLUTION INTRAMUSCULAR; INTRAVENOUS ONCE
Status: COMPLETED | OUTPATIENT
Start: 2019-10-03 | End: 2019-10-03

## 2019-10-03 RX ORDER — BUTALBITAL, ACETAMINOPHEN AND CAFFEINE 50; 325; 40 MG/1; MG/1; MG/1
1 TABLET ORAL EVERY 6 HOURS PRN
Qty: 10 TABLET | Refills: 0 | Status: SHIPPED | OUTPATIENT
Start: 2019-10-03 | End: 2019-10-10

## 2019-10-03 NOTE — ED PROVIDER NOTES
Patient Seen in: [facility] AND [facility] Emergency Department      History   Patient presents with:  Fever (infectious)    Stated Complaint: fever 101. 2.      HPI    43-year-old female with history of  hyperlipidemia, anxiety, seen here 3 days ago for viral syn throat. Negative for congestion. Eyes: Negative for pain, discharge and redness. Respiratory: Negative for cough, shortness of breath and wheezing. Cardiovascular: Negative for chest pain.    Gastrointestinal: Negative for abdominal pain, diarrhea, and time. 5/5 strength in b/l UEs and LEs, normal sensation in all extremities, normal finger to nose b/l, normal gait, no facial asymmetry, normal speech     Skin: Skin is warm and dry. No rash noted. She is not diaphoretic.    Psychiatric: She has a nor Imaging Results Available and Reviewed by me while in ED:          EMERGENCY DEPARTMENT COURSE AND TREATMENT:  Patient's condition was stable during Emergency Department evaluation.      24yoF with fever, sore throat, headache, ear pain  - I personall SORE THROAT) 10-2.1 MG Mouth/Throat Lozenge  Use as directed 1 lozenge in the mouth or throat 4 (four) times daily as needed (sore throat).   Qty: 36 lozenge Refills: 0    Butalbital-APAP-Caffeine -40 MG Oral Tab  Take 1 tablet by mouth every 6 (six)

## 2019-10-03 NOTE — ED INITIAL ASSESSMENT (HPI)
Patient here for a headache, pain in her sinuses and a sore throat for the last three days. States she was seen here two days ago for same. Patient also c/o a productive cough with white sputum.

## 2020-10-05 ENCOUNTER — HOSPITAL ENCOUNTER (EMERGENCY)
Facility: HOSPITAL | Age: 25
Discharge: HOME | End: 2020-10-05
Attending: EMERGENCY MEDICINE
Payer: COMMERCIAL

## 2020-10-05 VITALS
OXYGEN SATURATION: 99 % | TEMPERATURE: 98 F | WEIGHT: 293 LBS | SYSTOLIC BLOOD PRESSURE: 135 MMHG | RESPIRATION RATE: 25 BRPM | BODY MASS INDEX: 47 KG/M2 | HEART RATE: 101 BPM | DIASTOLIC BLOOD PRESSURE: 87 MMHG

## 2020-10-05 DIAGNOSIS — T78.40XA ALLERGIC REACTION, INITIAL ENCOUNTER: Primary | ICD-10-CM

## 2020-10-05 PROCEDURE — 85025 COMPLETE CBC W/AUTO DIFF WBC: CPT | Performed by: EMERGENCY MEDICINE

## 2020-10-05 PROCEDURE — 85007 BL SMEAR W/DIFF WBC COUNT: CPT | Performed by: EMERGENCY MEDICINE

## 2020-10-05 PROCEDURE — 85060 BLOOD SMEAR INTERPRETATION: CPT | Performed by: EMERGENCY MEDICINE

## 2020-10-05 PROCEDURE — 80048 BASIC METABOLIC PNL TOTAL CA: CPT | Performed by: EMERGENCY MEDICINE

## 2020-10-05 PROCEDURE — 96372 THER/PROPH/DIAG INJ SC/IM: CPT

## 2020-10-05 PROCEDURE — 96374 THER/PROPH/DIAG INJ IV PUSH: CPT

## 2020-10-05 PROCEDURE — 85027 COMPLETE CBC AUTOMATED: CPT | Performed by: EMERGENCY MEDICINE

## 2020-10-05 PROCEDURE — S0028 INJECTION, FAMOTIDINE, 20 MG: HCPCS | Performed by: EMERGENCY MEDICINE

## 2020-10-05 PROCEDURE — 99284 EMERGENCY DEPT VISIT MOD MDM: CPT

## 2020-10-05 PROCEDURE — 96375 TX/PRO/DX INJ NEW DRUG ADDON: CPT

## 2020-10-05 RX ORDER — PREDNISONE 20 MG/1
60 TABLET ORAL ONCE
Status: COMPLETED | OUTPATIENT
Start: 2020-10-05 | End: 2020-10-05

## 2020-10-05 RX ORDER — FAMOTIDINE 10 MG/ML
20 INJECTION, SOLUTION INTRAVENOUS ONCE
Status: COMPLETED | OUTPATIENT
Start: 2020-10-05 | End: 2020-10-05

## 2020-10-05 RX ORDER — DIPHENHYDRAMINE HYDROCHLORIDE 50 MG/ML
50 INJECTION INTRAMUSCULAR; INTRAVENOUS ONCE
Status: COMPLETED | OUTPATIENT
Start: 2020-10-05 | End: 2020-10-05

## 2020-10-05 RX ORDER — PREDNISONE 20 MG/1
20 TABLET ORAL DAILY
Qty: 5 TABLET | Refills: 0 | Status: SHIPPED | OUTPATIENT
Start: 2020-10-05 | End: 2020-10-10

## 2020-10-06 NOTE — ED NOTES
Ayaz MIDDLETON of LAB HEME department Blood is for Path review:  Preliminary : Neut 49                     Bands 8                     Lymphocytes 36                     Monocytes 5                     Eosenophils 1                     Bsophils 1    DR Gricelda Verma

## 2020-10-06 NOTE — ED INITIAL ASSESSMENT (HPI)
Intermittent hives, swelling since last Saturday. Denies any new medications/creams. Lower lip swelling noted, notes some difficulty swallowing, zyrtec at 7pm  Notes generalized rash. Patient speaking in full sentences.

## 2020-10-06 NOTE — ED NOTES
[facility] Pt resting in bed, respirating well on room air, AAOx4, pt denies complaint. Will continue to monitor.

## 2020-10-06 NOTE — ED PROVIDER NOTES
Patient Seen in: [facility] AND [facility] Emergency Department      History   Patient presents with:   Allergic Rxn Allergies    Stated Complaint: allergic reaction     HPI    47-year-old female with history of obesity, polycystic ovarian disease, hypercholester Review of Systems    Positive for stated complaint: allergic reaction   Other systems are as noted in HPI. Constitutional and vital signs reviewed. All other systems reviewed and negative except as noted above.     Physical Exam     ED Triage Vi CBC W/ DIFFERENTIAL[[id number]]          Abnormal            Preliminary result           Please view results for these tests on the individual orders.    MANUAL DIFFERENTIAL   MD BLOOD SMEAR CONSULT   RAINBOW DRAW BLUE   RAINBOW DRAW LAVENDER

## 2020-10-06 NOTE — ED NOTES
Report given to [facility] RN  Pt is stable upon handoff. Pt has Insulin drip infusing at 9 mls/hr and Normal Saline infusing at 25 mls/hr.

## 2020-10-26 ENCOUNTER — TELEPHONE (OUTPATIENT)
Dept: OBGYN CLINIC | Facility: CLINIC | Age: 25
End: 2020-10-26

## 2020-10-26 NOTE — TELEPHONE ENCOUNTER
Pt c/o a pea sized hard lump under left armpit. She first noticed it 5 days ago. Denies pain. I advised she contact her PCP or derm unless she prefers to see [facility] first. Pt states her PCP is a male and she would be more comfortable seeing [facility]. Pt aware KCGLENN d

## 2020-10-26 NOTE — TELEPHONE ENCOUNTER
Patient has bump under left arm pit, patient scheduled her annual pap on 12/4/2020. Patient also requesting new order for Ultra sound of the pelvis, please update at:[phone],thanks.

## 2020-12-04 ENCOUNTER — OFFICE VISIT (OUTPATIENT)
Dept: OBGYN CLINIC | Facility: CLINIC | Age: 25
End: 2020-12-04
Payer: COMMERCIAL

## 2020-12-04 VITALS
DIASTOLIC BLOOD PRESSURE: 81 MMHG | SYSTOLIC BLOOD PRESSURE: 120 MMHG | WEIGHT: 293 LBS | BODY MASS INDEX: 50 KG/M2 | HEART RATE: 92 BPM

## 2020-12-04 DIAGNOSIS — E28.2 PCOS (POLYCYSTIC OVARIAN SYNDROME): ICD-10-CM

## 2020-12-04 DIAGNOSIS — Z01.419 ENCOUNTER FOR GYNECOLOGICAL EXAMINATION WITHOUT ABNORMAL FINDING: Primary | ICD-10-CM

## 2020-12-04 DIAGNOSIS — N93.9 ABNORMAL UTERINE BLEEDING (AUB): ICD-10-CM

## 2020-12-04 PROCEDURE — 3074F SYST BP LT 130 MM HG: CPT | Performed by: OBSTETRICS & GYNECOLOGY

## 2020-12-04 PROCEDURE — 3079F DIAST BP 80-89 MM HG: CPT | Performed by: OBSTETRICS & GYNECOLOGY

## 2020-12-04 PROCEDURE — 99395 PREV VISIT EST AGE 18-39: CPT | Performed by: OBSTETRICS & GYNECOLOGY

## 2020-12-04 RX ORDER — ACETAMINOPHEN AND CODEINE PHOSPHATE 120; 12 MG/5ML; MG/5ML
0.35 SOLUTION ORAL DAILY
Qty: 3 PACKAGE | Refills: 4 | Status: SHIPPED | OUTPATIENT
Start: 2020-12-04

## 2020-12-04 RX ORDER — BLOOD SUGAR DIAGNOSTIC
STRIP MISCELLANEOUS
COMMUNITY
Start: 2020-09-30

## 2020-12-04 RX ORDER — LISINOPRIL 2.5 MG/1
TABLET ORAL
COMMUNITY
Start: 2020-11-18

## 2020-12-04 RX ORDER — ROSUVASTATIN CALCIUM 5 MG/1
TABLET, COATED ORAL
COMMUNITY
Start: 2020-10-01

## 2020-12-04 RX ORDER — BLOOD-GLUCOSE METER
EACH MISCELLANEOUS
COMMUNITY
Start: 2020-10-05

## 2020-12-04 RX ORDER — UBIQUINOL 100 MG
CAPSULE ORAL
COMMUNITY
Start: 2020-10-01

## 2020-12-04 RX ORDER — EPINEPHRINE 0.3 MG/.3ML
0.3 INJECTION SUBCUTANEOUS AS NEEDED
COMMUNITY
Start: 2020-10-06

## 2020-12-04 RX ORDER — MEDROXYPROGESTERONE ACETATE 10 MG/1
10 TABLET ORAL DAILY
Qty: 5 TABLET | Refills: 0 | Status: SHIPPED | OUTPATIENT
Start: 2020-12-04 | End: 2020-12-09

## 2020-12-04 RX ORDER — LANCETS 30 GAUGE
EACH MISCELLANEOUS
COMMUNITY
Start: 2020-11-23

## 2020-12-04 NOTE — PROGRESS NOTES
Well Woman Exam    HPI:  The patient is a 23yo female who presents today for annual exam. She is doing well. Recently diagnosed with DM. She has not had a period since last time she took Provera. No longer taking OCPs.     Reviewed medical and surgical hist Non-medical: Not on file    Tobacco Use      Smoking status: Never Smoker      Smokeless tobacco: Never Used    Substance and Sexual Activity      Alcohol use: No        Alcohol/week: 0.0 standard drinks        Comment: None      Drug use: No        Commen (Hyperlipidemia) Brother    • Diabetes Other        MEDICATIONS:    Current Outpatient Medications:   •  Alcohol Swabs (ALCOHOL PREP) 70 % Does not apply Pads, U BID., Disp: , Rfl:   •  Blood Glucose Monitoring Suppl (ONETOUCH ULTRA 2) w/Device Does not ap retraction or skin changes  Abdomen:  soft, nontender, nondistended, no masses  Skin/Hair: no unusual rashes or bruises  Extremities: no edema, no cyanosis  Psychiatric: Appropriate mood and affect    Pelvic Exam:  External Genitalia: normal appearance, ha

## 2020-12-11 ENCOUNTER — TELEPHONE (OUTPATIENT)
Dept: OBGYN CLINIC | Facility: CLINIC | Age: 25
End: 2020-12-11

## 2020-12-11 NOTE — TELEPHONE ENCOUNTER
Pt took provera x5 days and last pill was on 12/9 to induce period. Reports period has not started yet. Advised pt period can come within 2 weeks of taking last pill. Advised to call back if she does not get period. Pt agrees.

## 2021-01-06 ENCOUNTER — LAB ENCOUNTER (OUTPATIENT)
Dept: LAB | Age: 26
End: 2021-01-06
Attending: INTERNAL MEDICINE
Payer: COMMERCIAL

## 2021-01-06 DIAGNOSIS — E78.1 PURE HYPERGLYCERIDEMIA: ICD-10-CM

## 2021-01-06 DIAGNOSIS — E78.00 PURE HYPERCHOLESTEROLEMIA: Primary | ICD-10-CM

## 2021-01-06 LAB
ALBUMIN SERPL-MCNC: 3.8 G/DL (ref 3.4–5)
ALP LIVER SERPL-CCNC: 78 U/L
ALT SERPL-CCNC: 42 U/L
AST SERPL-CCNC: 21 U/L (ref 15–37)
BILIRUB DIRECT SERPL-MCNC: <0.1 MG/DL (ref 0–0.2)
BILIRUB SERPL-MCNC: 0.5 MG/DL (ref 0.1–2)
BILIRUB UR QL: NEGATIVE
COLOR UR: YELLOW
CREAT UR-SCNC: 289 MG/DL
GLUCOSE UR-MCNC: NEGATIVE MG/DL
HGB UR QL STRIP.AUTO: NEGATIVE
KETONES UR-MCNC: NEGATIVE MG/DL
M PROTEIN MFR SERPL ELPH: 8.1 G/DL (ref 6.4–8.2)
MICROALBUMIN UR-MCNC: 2.74 MG/DL
MICROALBUMIN/CREAT 24H UR-RTO: 9.5 UG/MG (ref ?–30)
NITRITE UR QL STRIP.AUTO: NEGATIVE
PH UR: 5 [PH] (ref 5–8)
PROT UR-MCNC: 30 MG/DL
RBC #/AREA URNS AUTO: 1 /HPF
SP GR UR STRIP: 1.03 (ref 1–1.03)
UROBILINOGEN UR STRIP-ACNC: <2
WBC #/AREA URNS AUTO: 20 /HPF

## 2021-01-06 PROCEDURE — 81001 URINALYSIS AUTO W/SCOPE: CPT

## 2021-01-06 PROCEDURE — 82570 ASSAY OF URINE CREATININE: CPT

## 2021-01-06 PROCEDURE — 82043 UR ALBUMIN QUANTITATIVE: CPT

## 2021-01-06 PROCEDURE — 84402 ASSAY OF FREE TESTOSTERONE: CPT

## 2021-01-06 PROCEDURE — 84403 ASSAY OF TOTAL TESTOSTERONE: CPT

## 2021-01-06 PROCEDURE — 36415 COLL VENOUS BLD VENIPUNCTURE: CPT

## 2021-01-06 PROCEDURE — 80076 HEPATIC FUNCTION PANEL: CPT

## 2021-01-11 ENCOUNTER — TELEPHONE (OUTPATIENT)
Dept: OBGYN CLINIC | Facility: CLINIC | Age: 26
End: 2021-01-11

## 2021-01-11 DIAGNOSIS — N91.2 AMENORRHEA: Primary | ICD-10-CM

## 2021-01-11 NOTE — TELEPHONE ENCOUNTER
[id number] Deidre Jordan for her to start micronor and see if this gives menses after first month. She can call if no menses still. She may not get a period with micronor which is ok, but I would like to know.

## 2021-01-11 NOTE — TELEPHONE ENCOUNTER
Pt took Provera x 5 days and has not had a period. Confirms her last dose was on 12/8/20. States she took the pills at the same time everyday. Has not started OCP's. Informed pt message will be sent to [facility] to please advise.

## 2021-01-11 NOTE — TELEPHONE ENCOUNTER
Pt informed of [facility] recs. Pt stated that she has not had a period in over a year and stated she has tried OCPs before to induce period and \"it never works\". Pt asking if there are any other Gemma Cleaves would have. Pt stated if not, she trusts Dr Kvng Brennan and will do as she advises. Message to Dionicio Gerber. Pt was aware that she may not get period with micronor and that's okay, but pt \"doesn't think this is normal\".

## 2021-01-12 LAB
TESTOSTERONE, FREE, S: 1.63 NG/DL
TESTOSTERONE, TOTAL, S: 38 NG/DL

## 2021-01-12 NOTE — TELEPHONE ENCOUNTER
Please order estradiol level, FSH, TSH and Prolactin for patient to do. Also would like abdominal Ultrasound (not sure she will tolerate pelvic US). Have her get this done and if all normal would recommend OCPs.

## 2021-01-19 ENCOUNTER — LAB ENCOUNTER (OUTPATIENT)
Dept: LAB | Age: 26
End: 2021-01-19
Attending: OBSTETRICS & GYNECOLOGY
Payer: COMMERCIAL

## 2021-01-19 DIAGNOSIS — R82.81 PYURIA: Primary | ICD-10-CM

## 2021-01-19 DIAGNOSIS — N91.2 AMENORRHEA: ICD-10-CM

## 2021-01-19 LAB
ESTRADIOL SERPL-MCNC: 33.3 PG/ML
FSH SERPL-ACNC: 6 MIU/ML
PROLACTIN SERPL-MCNC: 22.4 NG/ML
TSI SER-ACNC: 3.28 MIU/ML (ref 0.36–3.74)

## 2021-01-19 PROCEDURE — 87086 URINE CULTURE/COLONY COUNT: CPT

## 2021-01-19 PROCEDURE — 82670 ASSAY OF TOTAL ESTRADIOL: CPT

## 2021-01-19 PROCEDURE — 83001 ASSAY OF GONADOTROPIN (FSH): CPT

## 2021-01-19 PROCEDURE — 84443 ASSAY THYROID STIM HORMONE: CPT

## 2021-01-19 PROCEDURE — 84146 ASSAY OF PROLACTIN: CPT

## 2021-01-19 PROCEDURE — 36415 COLL VENOUS BLD VENIPUNCTURE: CPT

## 2021-02-17 ENCOUNTER — TELEPHONE (OUTPATIENT)
Dept: OBGYN CLINIC | Facility: CLINIC | Age: 26
End: 2021-02-17

## 2021-02-17 ENCOUNTER — HOSPITAL ENCOUNTER (OUTPATIENT)
Dept: ULTRASOUND IMAGING | Facility: HOSPITAL | Age: 26
Discharge: HOME | End: 2021-02-17
Attending: OBSTETRICS & GYNECOLOGY
Payer: COMMERCIAL

## 2021-02-17 DIAGNOSIS — N91.2 AMENORRHEA: ICD-10-CM

## 2021-02-17 PROCEDURE — 76856 US EXAM PELVIC COMPLETE: CPT | Performed by: OBSTETRICS & GYNECOLOGY

## 2021-02-17 NOTE — TELEPHONE ENCOUNTER
Pt informed of [facility] recs and verbalized understanding. Pt stated she has not started her micronor yet. Message to [facility] if you want pt to take provera first to help induce period and then start OCP?

## 2021-02-17 NOTE — TELEPHONE ENCOUNTER
Spoke with K[facility] and pt should have a pelvic US but transabdominal only because pt will not tolerate TVUS. Jorge Amaya from [address] notified.

## 2021-02-17 NOTE — TELEPHONE ENCOUNTER
----- Message from Demetrius Moon MD sent at 2/17/2021  2:59 PM CST -----  Please let patient know that her Ultrasound is normal. I recommend micronor for her at this time to help keep lining thin. She can do Provera q 3 months as well.

## 2021-02-18 NOTE — TELEPHONE ENCOUNTER
Pt can just start Micronor. She needs to remember that she may not get a period each month with micronor. This is fine, but she need the progesterone.

## 2021-02-18 NOTE — TELEPHONE ENCOUNTER
Notified pt of Leonor Esquivel below. Pt verbalized understanding. Advised pt to take hpt before she starts the Micronor. Pt agrees.

## 2021-06-23 ENCOUNTER — APPOINTMENT (OUTPATIENT)
Dept: GENERAL RADIOLOGY | Facility: HOSPITAL | Age: 26
End: 2021-06-23
Attending: EMERGENCY MEDICINE
Payer: COMMERCIAL

## 2021-06-23 ENCOUNTER — HOSPITAL ENCOUNTER (EMERGENCY)
Facility: HOSPITAL | Age: 26
Discharge: HOME | End: 2021-06-23
Attending: EMERGENCY MEDICINE
Payer: COMMERCIAL

## 2021-06-23 VITALS
SYSTOLIC BLOOD PRESSURE: 112 MMHG | RESPIRATION RATE: 24 BRPM | HEART RATE: 74 BPM | BODY MASS INDEX: 50 KG/M2 | OXYGEN SATURATION: 95 % | WEIGHT: 293 LBS | DIASTOLIC BLOOD PRESSURE: 72 MMHG | TEMPERATURE: 98 F

## 2021-06-23 DIAGNOSIS — R06.00 DYSPNEA, UNSPECIFIED TYPE: Primary | ICD-10-CM

## 2021-06-23 DIAGNOSIS — N30.00 ACUTE CYSTITIS WITHOUT HEMATURIA: ICD-10-CM

## 2021-06-23 DIAGNOSIS — R07.89 CHEST DISCOMFORT: ICD-10-CM

## 2021-06-23 PROCEDURE — 80048 BASIC METABOLIC PNL TOTAL CA: CPT | Performed by: EMERGENCY MEDICINE

## 2021-06-23 PROCEDURE — 93010 ELECTROCARDIOGRAM REPORT: CPT | Performed by: EMERGENCY MEDICINE

## 2021-06-23 PROCEDURE — 87086 URINE CULTURE/COLONY COUNT: CPT | Performed by: EMERGENCY MEDICINE

## 2021-06-23 PROCEDURE — 93005 ELECTROCARDIOGRAM TRACING: CPT

## 2021-06-23 PROCEDURE — 85379 FIBRIN DEGRADATION QUANT: CPT | Performed by: EMERGENCY MEDICINE

## 2021-06-23 PROCEDURE — 81001 URINALYSIS AUTO W/SCOPE: CPT | Performed by: EMERGENCY MEDICINE

## 2021-06-23 PROCEDURE — 84484 ASSAY OF TROPONIN QUANT: CPT | Performed by: EMERGENCY MEDICINE

## 2021-06-23 PROCEDURE — 81025 URINE PREGNANCY TEST: CPT

## 2021-06-23 PROCEDURE — 71045 X-RAY EXAM CHEST 1 VIEW: CPT | Performed by: EMERGENCY MEDICINE

## 2021-06-23 PROCEDURE — 85025 COMPLETE CBC W/AUTO DIFF WBC: CPT | Performed by: EMERGENCY MEDICINE

## 2021-06-23 PROCEDURE — 87147 CULTURE TYPE IMMUNOLOGIC: CPT | Performed by: EMERGENCY MEDICINE

## 2021-06-23 PROCEDURE — 99284 EMERGENCY DEPT VISIT MOD MDM: CPT

## 2021-06-23 PROCEDURE — 36415 COLL VENOUS BLD VENIPUNCTURE: CPT

## 2021-06-23 RX ORDER — NITROFURANTOIN 25; 75 MG/1; MG/1
100 CAPSULE ORAL 2 TIMES DAILY
Qty: 20 CAPSULE | Refills: 0 | Status: SHIPPED | OUTPATIENT
Start: 2021-06-23 | End: 2021-07-03

## 2021-06-23 NOTE — CM/SW NOTE
Patient called unable to schedule follow up appointment with Dr Rachael Pfeiffer called office and scheduled appointment for June 24 at 10:15am  [address] [place] [address]    Patient notiifed

## 2021-06-23 NOTE — ED INITIAL ASSESSMENT (HPI)
Patient here with c/o \"oxygen levels dropping when sleeping. \" States she measures it with over the counter oxygen monitor. +SOB and chest pain.

## 2021-06-23 NOTE — ED QUICK NOTES
Rounding Completed    Plan of Care reviewed. Waiting for urine sample. Elimination needs assessed. Bed is locked and in lowest position. Call light within reach.

## 2021-06-23 NOTE — ED PROVIDER NOTES
Patient Seen in: [facility] AND [facility] Emergency Department      History   Patient presents with:  Difficulty Breathing    Stated Complaint: shortness of breath     HPI/Subjective:   HPI    Patient presents to the emergency department complaining of shortnes HPI.  Constitutional and vital signs reviewed. All other systems reviewed and negative except as noted above.     Physical Exam     ED Triage Vitals [06/23/21 0703]   /89   Pulse 106   Resp 20   Temp 98.1 °F (36.7 °C)   Temp src Oral   SpO2 99 % components within normal limits   CBC W/ DIFFERENTIAL - Abnormal; Notable for the following components:    WBC 11.6 (*)     All other components within normal limits   TROPONIN I - Normal   D-DIMER - Normal   TROPONIN I - Normal   POCT PREGNANCY URINE - No am    Follow-up:  MD Kavon Suazo [id number]  [phone]    Schedule an appointment as soon as possible for a visit            Medications Prescribed:  Current Discharge Medication List    START taking these medication

## 2021-06-24 ENCOUNTER — OFFICE VISIT (OUTPATIENT)
Dept: PULMONOLOGY | Facility: CLINIC | Age: 26
End: 2021-06-24
Payer: COMMERCIAL

## 2021-06-24 VITALS
HEART RATE: 82 BPM | BODY MASS INDEX: 45.99 KG/M2 | OXYGEN SATURATION: 98 % | DIASTOLIC BLOOD PRESSURE: 72 MMHG | WEIGHT: 293 LBS | HEIGHT: 67 IN | SYSTOLIC BLOOD PRESSURE: 109 MMHG

## 2021-06-24 DIAGNOSIS — G47.33 OSA (OBSTRUCTIVE SLEEP APNEA): ICD-10-CM

## 2021-06-24 DIAGNOSIS — J84.10 GRANULOMATOUS LUNG DISEASE (HCC): Primary | ICD-10-CM

## 2021-06-24 PROCEDURE — 3074F SYST BP LT 130 MM HG: CPT | Performed by: INTERNAL MEDICINE

## 2021-06-24 PROCEDURE — 3078F DIAST BP <80 MM HG: CPT | Performed by: INTERNAL MEDICINE

## 2021-06-24 PROCEDURE — 99213 OFFICE O/P EST LOW 20 MIN: CPT | Performed by: INTERNAL MEDICINE

## 2021-06-24 PROCEDURE — 3008F BODY MASS INDEX DOCD: CPT | Performed by: INTERNAL MEDICINE

## 2021-06-24 NOTE — PROGRESS NOTES
The patient is a 49-year-old female who Eileen from prior evaluation comes in now for follow-up. She has a history of granulomatous lung disease 7 years 1 granuloma identified biopsy of the lung previously.   The AFB cultures and fungal cultures were ne

## 2021-06-30 ENCOUNTER — HOSPITAL ENCOUNTER (OUTPATIENT)
Dept: CT IMAGING | Facility: HOSPITAL | Age: 26
Discharge: HOME | End: 2021-06-30
Attending: INTERNAL MEDICINE
Payer: COMMERCIAL

## 2021-06-30 DIAGNOSIS — J84.10 GRANULOMATOUS LUNG DISEASE (HCC): ICD-10-CM

## 2021-06-30 PROCEDURE — 71260 CT THORAX DX C+: CPT | Performed by: INTERNAL MEDICINE

## 2021-08-31 NOTE — TELEPHONE ENCOUNTER
Patient said she was prescribed medication when she came in for her office visit. Patient said she was instructed to call back if she did not gotten her period. Patient states her period has not started. % depending upon preferences

## 2021-12-26 ENCOUNTER — HOSPITAL ENCOUNTER (EMERGENCY)
Facility: HOSPITAL | Age: 26
Discharge: HOME | End: 2021-12-27
Attending: EMERGENCY MEDICINE
Payer: COMMERCIAL

## 2021-12-26 DIAGNOSIS — U07.1 COVID-19: Primary | ICD-10-CM

## 2021-12-26 DIAGNOSIS — R07.89 CHEST PAIN, ATYPICAL: ICD-10-CM

## 2021-12-26 PROCEDURE — 84484 ASSAY OF TROPONIN QUANT: CPT | Performed by: EMERGENCY MEDICINE

## 2021-12-26 PROCEDURE — 80048 BASIC METABOLIC PNL TOTAL CA: CPT | Performed by: EMERGENCY MEDICINE

## 2021-12-26 PROCEDURE — 36415 COLL VENOUS BLD VENIPUNCTURE: CPT

## 2021-12-26 PROCEDURE — 93010 ELECTROCARDIOGRAM REPORT: CPT | Performed by: EMERGENCY MEDICINE

## 2021-12-26 PROCEDURE — 99284 EMERGENCY DEPT VISIT MOD MDM: CPT

## 2021-12-26 PROCEDURE — 93005 ELECTROCARDIOGRAM TRACING: CPT

## 2021-12-27 ENCOUNTER — APPOINTMENT (OUTPATIENT)
Dept: GENERAL RADIOLOGY | Facility: HOSPITAL | Age: 26
End: 2021-12-27
Attending: EMERGENCY MEDICINE
Payer: COMMERCIAL

## 2021-12-27 VITALS
BODY MASS INDEX: 45.52 KG/M2 | RESPIRATION RATE: 27 BRPM | HEIGHT: 67 IN | WEIGHT: 290 LBS | TEMPERATURE: 99 F | HEART RATE: 68 BPM | SYSTOLIC BLOOD PRESSURE: 129 MMHG | DIASTOLIC BLOOD PRESSURE: 67 MMHG | OXYGEN SATURATION: 99 %

## 2021-12-27 LAB
ANION GAP SERPL CALC-SCNC: 7 MMOL/L (ref 0–18)
BUN BLD-MCNC: 10 MG/DL (ref 7–18)
BUN/CREAT SERPL: 13.9 (ref 10–20)
CALCIUM BLD-MCNC: 8.9 MG/DL (ref 8.5–10.1)
CHLORIDE SERPL-SCNC: 106 MMOL/L (ref 98–112)
CO2 SERPL-SCNC: 27 MMOL/L (ref 21–32)
CREAT BLD-MCNC: 0.72 MG/DL
GLUCOSE BLD-MCNC: 133 MG/DL (ref 70–99)
OSMOLALITY SERPL CALC.SUM OF ELEC: 291 MOSM/KG (ref 275–295)
POTASSIUM SERPL-SCNC: 3.8 MMOL/L (ref 3.5–5.1)
SODIUM SERPL-SCNC: 140 MMOL/L (ref 136–145)
TROPONIN I HIGH SENSITIVITY: 5 NG/L

## 2021-12-27 PROCEDURE — 71045 X-RAY EXAM CHEST 1 VIEW: CPT | Performed by: EMERGENCY MEDICINE

## 2021-12-27 RX ORDER — KETOROLAC TROMETHAMINE 15 MG/ML
15 INJECTION, SOLUTION INTRAMUSCULAR; INTRAVENOUS ONCE
Status: DISCONTINUED | OUTPATIENT
Start: 2021-12-27 | End: 2021-12-27

## 2021-12-27 NOTE — ED PROVIDER NOTES
Patient Seen in: [facility] AND [facility] Emergency Department      History   Patient presents with:  Covid  Chest Pain Angina    Stated Complaint: covid chest pain    Subjective:   HPI    25-year-old female with past medical history significant for granulomato Exam   Constitutional: AAOx3, well nourished, anxious appearing, obese  Head: Normocephalic and atraumatic.    Ears: TM's clear b/l  Nose: Nose normal.   Mouth/Throat: MMM, post OP clear with no exudates  Eyes: Conjunctivae and EOM are normal. PERRLA  Neck:

## 2022-09-20 ENCOUNTER — ORDER TRANSCRIPTION (OUTPATIENT)
Dept: ADMINISTRATIVE | Facility: HOSPITAL | Age: 27
End: 2022-09-20

## 2022-09-20 ENCOUNTER — LAB ENCOUNTER (OUTPATIENT)
Dept: LAB | Age: 27
End: 2022-09-20
Attending: INTERNAL MEDICINE

## 2022-09-20 DIAGNOSIS — Z11.59 ENCOUNTER FOR SCREENING FOR OTHER VIRAL DISEASES: ICD-10-CM

## 2022-09-20 DIAGNOSIS — Z11.59 ENCOUNTER FOR SCREENING FOR OTHER VIRAL DISEASES: Primary | ICD-10-CM

## 2022-09-21 LAB — SARS-COV-2 RNA RESP QL NAA+PROBE: NOT DETECTED

## 2023-08-02 ENCOUNTER — TELEPHONE (OUTPATIENT)
Facility: CLINIC | Age: 28
End: 2023-08-02

## 2023-08-02 NOTE — TELEPHONE ENCOUNTER
----- Message from Catie Orellana, [address] [place] [address] Marina sent at 10/2/2018  5:19 PM CDT -----  Regarding: Recall Colon  Recall colon for 5 years per SDK.  Colon done 10/2/18

## 2023-12-29 ENCOUNTER — LAB ENCOUNTER (OUTPATIENT)
Dept: LAB | Facility: HOSPITAL | Age: 28
End: 2023-12-29
Attending: INTERNAL MEDICINE
Payer: COMMERCIAL

## 2023-12-29 DIAGNOSIS — R51.9 FACIAL PAIN: Primary | ICD-10-CM

## 2023-12-29 DIAGNOSIS — J30.9 ALLERGIC RHINITIS DUE TO ALLERGEN: ICD-10-CM

## 2023-12-29 PROCEDURE — 87635 SARS-COV-2 COVID-19 AMP PRB: CPT

## 2023-12-30 LAB — SARS-COV-2 RNA RESP QL NAA+PROBE: DETECTED

## 2024-05-28 ENCOUNTER — HOSPITAL ENCOUNTER (EMERGENCY)
Facility: HOSPITAL | Age: 29
Discharge: HOME | End: 2024-05-28
Attending: EMERGENCY MEDICINE

## 2024-05-28 VITALS
SYSTOLIC BLOOD PRESSURE: 119 MMHG | HEIGHT: 67 IN | RESPIRATION RATE: 18 BRPM | WEIGHT: 280 LBS | DIASTOLIC BLOOD PRESSURE: 71 MMHG | BODY MASS INDEX: 43.95 KG/M2 | HEART RATE: 81 BPM | OXYGEN SATURATION: 99 % | TEMPERATURE: 98 F

## 2024-05-28 DIAGNOSIS — R91.1 PULMONARY NODULE: Primary | ICD-10-CM

## 2024-05-28 DIAGNOSIS — J18.9 COMMUNITY ACQUIRED PNEUMONIA, UNSPECIFIED LATERALITY: ICD-10-CM

## 2024-05-28 PROCEDURE — 99283 EMERGENCY DEPT VISIT LOW MDM: CPT

## 2024-05-28 PROCEDURE — 99284 EMERGENCY DEPT VISIT MOD MDM: CPT

## 2024-05-28 RX ORDER — LEVOFLOXACIN 500 MG/1
500 TABLET, FILM COATED ORAL DAILY
Qty: 7 TABLET | Refills: 0 | Status: SHIPPED | OUTPATIENT
Start: 2024-05-28 | End: 2024-06-04

## 2024-05-28 NOTE — ED INITIAL ASSESSMENT (HPI)
Presents with c/o cough, congestion, fevers x 9 days  Pt states she was seen at urgent care this morning and chest xray was performed, pt sent here for further evaluation of large nodule found on right lung (xray copy on disc)    Pt finished zpack yesterday (5 day course)  Pt denies recent travel  States she has had exposure to her niece about 10 days ago [place] and came home ill

## 2024-05-29 NOTE — ED QUICK NOTES
Pt ambulatory with steady gait, discharge instructions reviewed and pt understands follow up and when to return.

## 2024-05-29 NOTE — ED PROVIDER NOTES
Patient Seen in: [facility] Emergency Department    History     Chief Complaint   Patient presents with    Cough/URI    Abnormal Result       HPI    28-year-old female with past medical history significant for anxiety, high cholesterol, polycystic ovarian disease, presents to the emergency department for evaluation of pulmonary nodule seen on chest x-ray.  Patient states that she has been having cough, congestion, fevers, for 9 days.  She finished a Z-Kristian yesterday.  Because she was still having symptoms, she went to urgent care today and had a chest x-ray completed.  The chest x-ray showed that she had a pulmonary nodule and she was referred to the ER for further management.  Patient denies any shortness of breath, vomiting, diarrhea.    History from Independent Source:       External Records Reviewed: Reviewed x-ray report from urgent care and it shows that patient has a 1.7 cm right middle lobe nodule as well has bilateral lower lobe patchy infiltrates.  Reviewed prior history available in EMR and patient has a history of pulmonary nodule in the right middle lobe for which she has previously seen pulmonology.  She had a CT scan in June 2021 that showed that the nodule was 1.1 cm in size.    History reviewed.   Past Medical History:    Anxiety state    Colon polyp    repeat CLN in 5 years    High cholesterol    Hyperlipidemia    Polycystic ovaries    PONV (postoperative nausea and vomiting)    Shortness of breath    Visual impairment       History reviewed.   Past Surgical History:   Procedure Laterality Date    Colonoscopy N/A 10/2/2018    Procedure: COLONOSCOPY;  Surgeon: STEPHEN Dawn MD;  Location: [facility] ENDOSCOPY    Tonsillectomy  07/2016         Medications :  (Not in a hospital admission)       Family History   Problem Relation Age of Onset    Diabetes Father     Cancer Father 64        Lymphoma     Lipids Brother         hyperlipidemia    Other (Hyperlipidemia) Brother     Diabetes Other         Smoking Status:   Social History     Socioeconomic History    Marital status:    Tobacco Use    Smoking status: Never    Smokeless tobacco: Never   Vaping Use    Vaping status: Never Used   Substance and Sexual Activity    Alcohol use: No     Alcohol/week: 0.0 standard drinks of alcohol     Comment: None    Drug use: No     Comment: none    Sexual activity: Yes     Birth control/protection: OCP     Comment: none   Other Topics Concern    Caffeine Concern No    Exercise No    Pt has a pacemaker No    Pt has a defibrillator No    Reaction to local anesthetic No       Constitutional and vital signs reviewed.      Social History and Family History elements reviewed from today, pertinent positives to the presenting problem noted.    Physical Exam     ED Triage Vitals [05/28/24 1828]   /82   Pulse 98   Resp 20   Temp 97.8 °F (36.6 °C)   Temp src    SpO2 98 %   O2 Device None (Room air)       Physical Exam   Constitutional: AAOx3, well nourished, NAD  HEENT: Normocephalic, PERRLA, MMM  CV: s1s2+, RRR, no m/r/g, normal distal pulses  Pulmonary/Chest: CTA b/l with no rales, wheezes.  No chest wall tenderness  Abdominal: Nontender.  Nondistended. Soft. Bowel sounds are normal.   Neck/Back:   :   Musculoskeletal: Normal range of motion. No deformity.   Neurological: Awake, alert. Normal reflexes. No cranial nerve deficit.    Skin: Skin is warm and dry. No rash noted. No erythema.   Psychiatric:      All measures to prevent infection transmission during my interaction with the patient were taken. The patient was already wearing a droplet mask on my arrival to the room. Personal protective equipment was worn throughout the duration of the exam.      ED Course      Labs Reviewed - No data to display  My Independent Interpretation of EKG (if performed):     Imaging Results Available and Reviewed while in ED: No results found.  ED Medications Administered: Medications - No data to display          MDM      Vitals:    05/28/24 1828 05/28/24 1930 05/28/24 2015 05/28/24 2030   BP: 121/82 121/87 122/75 119/71   Pulse: 98 101 93 81   Resp: 20 18 18 18   Temp: 97.8 °F (36.6 °C)      SpO2: 98% 99% 97% 99%   Weight: 127 kg      Height: 170.2 cm (5' 7\")        *I personally reviewed and interpreted all ED vitals.    Independent Interpretation of Studies:     Social Determinants of Health:     Procedures:      Differential/MDM/Shared Decision Making: Differential Diagnosis includes pulmonary nodule, cancer, abscess, others.      The patient already has prior history pulmonary nodule in the right middle lobe, anxiety, high cholesterol, polycystic ovarian disease, to contribute to the complexity of this ED evaluation.           Patient has a pulmonary nodule in the same location as where her provider pulmonary nodule was.  There may have been an interval change in size of 6 mm in the past 3 years ago it is difficult to follow based on chest x-ray versus CT.  Offered patient further CT imaging today but using shared decision making, patient will hold off imaging today and will follow-up with Dr. Qiu who she has not seen in several years.  Will place on Levaquin for community-acquired pneumonia.  Discussed case with patient and , and they are comfortable with discharge plan.      Condition upon leaving the department: Stable    Disposition and Plan     Clinical Impression:  1. Pulmonary nodule    2. Community acquired pneumonia, unspecified laterality        Disposition:  Discharge    Follow-up:  Angela Hernandez MD  [address] [place] [id number]  [phone]    Call in 2 day(s)      Tony Qiu MD  [address] [address]  [phone]    Call in 1 day(s)        Medications Prescribed:  Current Discharge Medication List        START taking these medications    Details   levoFLOXacin 500 MG Oral Tab Take 1 tablet (500 mg total) by mouth daily for 7 days.  Qty: 7 tablet, Refills: 0

## 2024-08-09 ENCOUNTER — HOSPITAL ENCOUNTER (EMERGENCY)
Facility: HOSPITAL | Age: 29
Discharge: HOME | End: 2024-08-10
Payer: COMMERCIAL

## 2024-08-09 DIAGNOSIS — G44.209 ACUTE NON INTRACTABLE TENSION-TYPE HEADACHE: Primary | ICD-10-CM

## 2024-08-09 LAB
ANION GAP SERPL CALC-SCNC: 8 MMOL/L (ref 0–18)
BASOPHILS # BLD AUTO: 0.06 X10(3) UL (ref 0–0.2)
BASOPHILS NFR BLD AUTO: 0.4 %
BUN BLD-MCNC: 13 MG/DL (ref 9–23)
BUN/CREAT SERPL: 16.3 (ref 10–20)
CALCIUM BLD-MCNC: 9.8 MG/DL (ref 8.7–10.4)
CHLORIDE SERPL-SCNC: 108 MMOL/L (ref 98–112)
CO2 SERPL-SCNC: 26 MMOL/L (ref 21–32)
CREAT BLD-MCNC: 0.8 MG/DL
DEPRECATED RDW RBC AUTO: 39.7 FL (ref 35.1–46.3)
EGFRCR SERPLBLD CKD-EPI 2021: 101 ML/MIN/1.73M2 (ref 60–?)
EOSINOPHIL # BLD AUTO: 0.24 X10(3) UL (ref 0–0.7)
EOSINOPHIL NFR BLD AUTO: 1.7 %
ERYTHROCYTE [DISTWIDTH] IN BLOOD BY AUTOMATED COUNT: 13.3 % (ref 11–15)
GLUCOSE BLD-MCNC: 136 MG/DL (ref 70–99)
HCT VFR BLD AUTO: 41.3 %
HGB BLD-MCNC: 13.7 G/DL
IMM GRANULOCYTES # BLD AUTO: 0.04 X10(3) UL (ref 0–1)
IMM GRANULOCYTES NFR BLD: 0.3 %
LYMPHOCYTES # BLD AUTO: 3.34 X10(3) UL (ref 1–4)
LYMPHOCYTES NFR BLD AUTO: 23 %
MCH RBC QN AUTO: 27.2 PG (ref 26–34)
MCHC RBC AUTO-ENTMCNC: 33.2 G/DL (ref 31–37)
MCV RBC AUTO: 81.9 FL
MONOCYTES # BLD AUTO: 0.75 X10(3) UL (ref 0.1–1)
MONOCYTES NFR BLD AUTO: 5.2 %
NEUTROPHILS # BLD AUTO: 10.09 X10 (3) UL (ref 1.5–7.7)
NEUTROPHILS # BLD AUTO: 10.09 X10(3) UL (ref 1.5–7.7)
NEUTROPHILS NFR BLD AUTO: 69.4 %
OSMOLALITY SERPL CALC.SUM OF ELEC: 296 MOSM/KG (ref 275–295)
PLATELET # BLD AUTO: 305 10(3)UL (ref 150–450)
POTASSIUM SERPL-SCNC: 4.1 MMOL/L (ref 3.5–5.1)
RBC # BLD AUTO: 5.04 X10(6)UL
SODIUM SERPL-SCNC: 142 MMOL/L (ref 136–145)
WBC # BLD AUTO: 14.5 X10(3) UL (ref 4–11)

## 2024-08-09 PROCEDURE — 99284 EMERGENCY DEPT VISIT MOD MDM: CPT

## 2024-08-09 PROCEDURE — 80048 BASIC METABOLIC PNL TOTAL CA: CPT | Performed by: NURSE PRACTITIONER

## 2024-08-09 PROCEDURE — 96374 THER/PROPH/DIAG INJ IV PUSH: CPT

## 2024-08-09 PROCEDURE — 96361 HYDRATE IV INFUSION ADD-ON: CPT

## 2024-08-09 PROCEDURE — 96375 TX/PRO/DX INJ NEW DRUG ADDON: CPT

## 2024-08-09 PROCEDURE — 85025 COMPLETE CBC W/AUTO DIFF WBC: CPT | Performed by: NURSE PRACTITIONER

## 2024-08-09 RX ORDER — KETOROLAC TROMETHAMINE 15 MG/ML
15 INJECTION, SOLUTION INTRAMUSCULAR; INTRAVENOUS ONCE
Status: COMPLETED | OUTPATIENT
Start: 2024-08-09 | End: 2024-08-09

## 2024-08-10 ENCOUNTER — APPOINTMENT (OUTPATIENT)
Dept: CT IMAGING | Facility: HOSPITAL | Age: 29
End: 2024-08-10
Attending: NURSE PRACTITIONER
Payer: COMMERCIAL

## 2024-08-10 VITALS
SYSTOLIC BLOOD PRESSURE: 136 MMHG | HEART RATE: 71 BPM | HEIGHT: 66 IN | TEMPERATURE: 99 F | OXYGEN SATURATION: 100 % | RESPIRATION RATE: 20 BRPM | BODY MASS INDEX: 43.39 KG/M2 | WEIGHT: 270 LBS | DIASTOLIC BLOOD PRESSURE: 74 MMHG

## 2024-08-10 PROCEDURE — 70450 CT HEAD/BRAIN W/O DYE: CPT | Performed by: NURSE PRACTITIONER

## 2024-08-10 RX ORDER — METOCLOPRAMIDE HYDROCHLORIDE 5 MG/ML
10 INJECTION INTRAMUSCULAR; INTRAVENOUS ONCE
Status: COMPLETED | OUTPATIENT
Start: 2024-08-10 | End: 2024-08-10

## 2024-08-10 RX ORDER — DEXAMETHASONE SODIUM PHOSPHATE 10 MG/ML
10 INJECTION, SOLUTION INTRAMUSCULAR; INTRAVENOUS ONCE
Status: COMPLETED | OUTPATIENT
Start: 2024-08-10 | End: 2024-08-10

## 2024-08-10 NOTE — ED PROVIDER NOTES
Patient Seen in: [facility] Emergency Department      History     Chief Complaint   Patient presents with    Headache     Stated Complaint: headache x5 days    Subjective:   28yo/f w no chronic medical problems reports to the ED w c/o intermittent headache x 1 mo. Worsening the past 2 days. No improvement with Excedrin. On Monjouro for DM2/Obesity. Recent increase. No vomiting. No chest pain. No flank pain. No back pain. Non exertional. No deficits or vision changes.             Objective:   No pertinent past medical history.            No pertinent past surgical history.              No pertinent social history.            Review of Systems   All other systems reviewed and are negative.      Positive for stated Chief Complaint: Headache    Other systems are as noted in HPI.  Constitutional and vital signs reviewed.      All other systems reviewed and negative except as noted above.    Physical Exam     ED Triage Vitals [08/09/24 2218]   /73   Pulse 98   Resp 20   Temp 98.6 °F (37 °C)   Temp src    SpO2 100 %   O2 Device None (Room air)       Current Vitals:   Vital Signs  BP: 123/73  Pulse: 98  Resp: 20  Temp: 98.6 °F (37 °C)    Oxygen Therapy  SpO2: 100 %  O2 Device: None (Room air)            Physical Exam  Vitals and nursing note reviewed.   Constitutional:       General: She is not in acute distress.     Appearance: She is well-developed.   HENT:      Head: Normocephalic and atraumatic.      Nose: Nose normal.      Mouth/Throat:      Mouth: Mucous membranes are moist.   Eyes:      Conjunctiva/sclera: Conjunctivae normal.      Pupils: Pupils are equal, round, and reactive to light.   Cardiovascular:      Rate and Rhythm: Normal rate and regular rhythm.      Heart sounds: Normal heart sounds.   Pulmonary:      Effort: Pulmonary effort is normal.      Breath sounds: Normal breath sounds.   Abdominal:      General: Bowel sounds are normal.      Palpations: Abdomen is soft.   Musculoskeletal:          General: No tenderness or deformity. Normal range of motion.      Cervical back: Normal range of motion and neck supple.   Skin:     General: Skin is warm and dry.      Capillary Refill: Capillary refill takes less than 2 seconds.      Findings: No rash.      Comments: Normal color   Neurological:      General: No focal deficit present.      Mental Status: She is alert and oriented to person, place, and time.      GCS: GCS eye subscore is 4. GCS verbal subscore is 5. GCS motor subscore is 6.      Cranial Nerves: No cranial nerve deficit.      Gait: Gait normal.               ED Course     Labs Reviewed   CBC WITH DIFFERENTIAL WITH PLATELET - Abnormal; Notable for the following components:       Result Value    WBC 14.5 (*)     Neutrophil Absolute Prelim 10.09 (*)     Neutrophil Absolute 10.09 (*)     All other components within normal limits   BASIC METABOLIC PANEL (8) - Abnormal; Notable for the following components:    Glucose 136 (*)     Calculated Osmolality 296 (*)     All other components within normal limits             Noncontrast head CT  COMPARISON: Prior from 9/8/2018    IMPRESSION:   No acute intracranial hemorrhage.  No edema or mass effect.  Normal size ventricles.  Basal cisterns are patent.  No evidence of an acute major infarct.  Paranasal sinuses are clear.  Bony calvarium is intact.         MDM               Medical Decision Making  30yo/f w hx and exam as stated; headache    Normal neuro exam  Labs non acute  No fever neck  No fever  No vomiting  No vision changes  Overall stable, well appearing  Ct non acute    Plan  Dc to home  Close fu      Amount and/or Complexity of Data Reviewed  Labs:  Decision-making details documented in ED Course.  Radiology:  Decision-making details documented in ED Course.    Risk  OTC drugs.  Prescription drug management.        Disposition and Plan     Clinical Impression:  1. Acute non intractable tension-type headache         Disposition:  Discharge  8/10/2024  12:47 am    Follow-up:  Angela Hernandez MD  [address] [facility] [address]  [phone]    Follow up in 2 day(s)            Medications Prescribed:  Current Discharge Medication List

## 2024-08-10 NOTE — ED INITIAL ASSESSMENT (HPI)
Pt to ED with intermittent headache x1 month, worse x2 days. Pt states nausea and dizziness and denies vision changes or light sensitivity. Pt has been taking Excedrin for the last 2 days without improvement.

## 2025-01-13 ENCOUNTER — APPOINTMENT (OUTPATIENT)
Dept: CT IMAGING | Facility: HOSPITAL | Age: 30
End: 2025-01-13
Attending: EMERGENCY MEDICINE
Payer: COMMERCIAL

## 2025-01-13 ENCOUNTER — HOSPITAL ENCOUNTER (EMERGENCY)
Facility: HOSPITAL | Age: 30
Discharge: HOME | End: 2025-01-13
Attending: EMERGENCY MEDICINE
Payer: COMMERCIAL

## 2025-01-13 ENCOUNTER — TELEPHONE (OUTPATIENT)
Facility: CLINIC | Age: 30
End: 2025-01-13

## 2025-01-13 VITALS
HEIGHT: 67 IN | TEMPERATURE: 98 F | SYSTOLIC BLOOD PRESSURE: 108 MMHG | BODY MASS INDEX: 42.38 KG/M2 | OXYGEN SATURATION: 97 % | RESPIRATION RATE: 18 BRPM | DIASTOLIC BLOOD PRESSURE: 64 MMHG | HEART RATE: 101 BPM | WEIGHT: 270 LBS

## 2025-01-13 DIAGNOSIS — N30.00 ACUTE CYSTITIS WITHOUT HEMATURIA: ICD-10-CM

## 2025-01-13 DIAGNOSIS — M54.50 ACUTE LEFT-SIDED LOW BACK PAIN WITHOUT SCIATICA: Primary | ICD-10-CM

## 2025-01-13 LAB
ALBUMIN SERPL-MCNC: 4.8 G/DL (ref 3.2–4.8)
ALBUMIN/GLOB SERPL: 1.5 {RATIO} (ref 1–2)
ALP LIVER SERPL-CCNC: 75 U/L
ALT SERPL-CCNC: 34 U/L
ANION GAP SERPL CALC-SCNC: 11 MMOL/L (ref 0–18)
AST SERPL-CCNC: 21 U/L (ref ?–34)
B-HCG UR QL: NEGATIVE
BASOPHILS # BLD AUTO: 0.07 X10(3) UL (ref 0–0.2)
BASOPHILS NFR BLD AUTO: 0.5 %
BILIRUB SERPL-MCNC: 0.3 MG/DL (ref 0.3–1.2)
BILIRUB UR QL: NEGATIVE
BUN BLD-MCNC: 15 MG/DL (ref 9–23)
BUN/CREAT SERPL: 19.5 (ref 10–20)
CALCIUM BLD-MCNC: 10.2 MG/DL (ref 8.7–10.4)
CHLORIDE SERPL-SCNC: 103 MMOL/L (ref 98–112)
CLARITY UR: CLEAR
CO2 SERPL-SCNC: 26 MMOL/L (ref 21–32)
CREAT BLD-MCNC: 0.77 MG/DL
DEPRECATED RDW RBC AUTO: 37.7 FL (ref 35.1–46.3)
EGFRCR SERPLBLD CKD-EPI 2021: 106 ML/MIN/1.73M2 (ref 60–?)
EOSINOPHIL # BLD AUTO: 0.21 X10(3) UL (ref 0–0.7)
EOSINOPHIL NFR BLD AUTO: 1.4 %
ERYTHROCYTE [DISTWIDTH] IN BLOOD BY AUTOMATED COUNT: 13.3 % (ref 11–15)
GLOBULIN PLAS-MCNC: 3.1 G/DL (ref 2–3.5)
GLUCOSE BLD-MCNC: 163 MG/DL (ref 70–99)
GLUCOSE UR-MCNC: NORMAL MG/DL
HCT VFR BLD AUTO: 43.2 %
HGB BLD-MCNC: 14.3 G/DL
HGB UR QL STRIP.AUTO: NEGATIVE
IMM GRANULOCYTES # BLD AUTO: 0.1 X10(3) UL (ref 0–1)
IMM GRANULOCYTES NFR BLD: 0.6 %
KETONES UR-MCNC: NEGATIVE MG/DL
LEUKOCYTE ESTERASE UR QL STRIP.AUTO: 250
LIPASE SERPL-CCNC: 49 U/L (ref 12–53)
LYMPHOCYTES # BLD AUTO: 3.53 X10(3) UL (ref 1–4)
LYMPHOCYTES NFR BLD AUTO: 22.7 %
MCH RBC QN AUTO: 26.2 PG (ref 26–34)
MCHC RBC AUTO-ENTMCNC: 33.1 G/DL (ref 31–37)
MCV RBC AUTO: 79.1 FL
MONOCYTES # BLD AUTO: 0.78 X10(3) UL (ref 0.1–1)
MONOCYTES NFR BLD AUTO: 5 %
NEUTROPHILS # BLD AUTO: 10.86 X10 (3) UL (ref 1.5–7.7)
NEUTROPHILS # BLD AUTO: 10.86 X10(3) UL (ref 1.5–7.7)
NEUTROPHILS NFR BLD AUTO: 69.8 %
NITRITE UR QL STRIP.AUTO: NEGATIVE
OSMOLALITY SERPL CALC.SUM OF ELEC: 294 MOSM/KG (ref 275–295)
PH UR: 6 [PH] (ref 5–8)
PLATELET # BLD AUTO: 370 10(3)UL (ref 150–450)
POTASSIUM SERPL-SCNC: 4 MMOL/L (ref 3.5–5.1)
PROT SERPL-MCNC: 7.9 G/DL (ref 5.7–8.2)
PROT UR-MCNC: NEGATIVE MG/DL
RBC # BLD AUTO: 5.46 X10(6)UL
SODIUM SERPL-SCNC: 140 MMOL/L (ref 136–145)
SP GR UR STRIP: 1.02 (ref 1–1.03)
UROBILINOGEN UR STRIP-ACNC: NORMAL
WBC # BLD AUTO: 15.6 X10(3) UL (ref 4–11)

## 2025-01-13 PROCEDURE — 99284 EMERGENCY DEPT VISIT MOD MDM: CPT

## 2025-01-13 PROCEDURE — 74176 CT ABD & PELVIS W/O CONTRAST: CPT | Performed by: EMERGENCY MEDICINE

## 2025-01-13 PROCEDURE — 87147 CULTURE TYPE IMMUNOLOGIC: CPT | Performed by: EMERGENCY MEDICINE

## 2025-01-13 PROCEDURE — 81025 URINE PREGNANCY TEST: CPT

## 2025-01-13 PROCEDURE — 85025 COMPLETE CBC W/AUTO DIFF WBC: CPT | Performed by: EMERGENCY MEDICINE

## 2025-01-13 PROCEDURE — 80053 COMPREHEN METABOLIC PANEL: CPT | Performed by: EMERGENCY MEDICINE

## 2025-01-13 PROCEDURE — 83690 ASSAY OF LIPASE: CPT | Performed by: EMERGENCY MEDICINE

## 2025-01-13 PROCEDURE — 81001 URINALYSIS AUTO W/SCOPE: CPT | Performed by: EMERGENCY MEDICINE

## 2025-01-13 PROCEDURE — 87086 URINE CULTURE/COLONY COUNT: CPT | Performed by: EMERGENCY MEDICINE

## 2025-01-13 PROCEDURE — 36415 COLL VENOUS BLD VENIPUNCTURE: CPT

## 2025-01-13 RX ORDER — CYCLOBENZAPRINE HCL 10 MG
10 TABLET ORAL 3 TIMES DAILY PRN
Qty: 20 TABLET | Refills: 0 | Status: SHIPPED | OUTPATIENT
Start: 2025-01-13 | End: 2025-01-20

## 2025-01-13 RX ORDER — IBUPROFEN 600 MG/1
600 TABLET, FILM COATED ORAL EVERY 8 HOURS PRN
Qty: 20 TABLET | Refills: 0 | Status: SHIPPED | OUTPATIENT
Start: 2025-01-13 | End: 2025-01-20

## 2025-01-13 RX ORDER — LIDOCAINE 50 MG/G
1 PATCH TOPICAL EVERY 24 HOURS
Qty: 10 PATCH | Refills: 0 | Status: SHIPPED | OUTPATIENT
Start: 2025-01-13

## 2025-01-13 NOTE — TELEPHONE ENCOUNTER
Called patient for scheduled telephone colon screen.   Please advise on colonoscopy and bowel prep orders.   Medications, pharmacy, and allergies reviewed.     Age 45-76 y/o:   › MD preference: Dr. Dawn  › Insurance:   [facility]  › Last PCP visit: over 1 year   › Last CBC: 2024  › H/W/BMI: 5'7\"/265 lbs/41.5    Special comments/notes:  Recall    Last Procedure, Date, MD:   Dr. Dawn,Colon/EGD, 10/2/2018   Last diagnosis: See below   Recalled for (mth/yrs): 5 years   Sedation used previously: MAC   Last Prep Used: trilytes   Quality of Prep: good     Telephone Colon Screening Questionnaire Yes No   Are you currently experiencing any GI symptoms [] [x]   If yes, explain:     Rectal bleeding [] [x]   Black stool [] [x]   Dysphagia or food \"feeling stuck\" when eating [] [x]   Intractable vomiting [] [x]   Unexplained weight loss [] [x]   First colonoscopy [] [x]   Family history of colon cancer [] [x]   Any issues with anesthesia-  Nauseous [x] []   If yes, explain:      Any recent complaints related to chest pain &/or shortness of breath [] [x]   Referred to a cardiologist?  [] [x]   If yes, explain:      History of  respiratory issues/oxygen/JOANA/COPD [] [x]   CPAP/BiPAP:     History of devices (pacemaker/defibrillator) [] [x]   History of heart attack &/or stroke [] [x]   If yes, in the last 12 months? Stent placement?  [] [x]     Medication Reconciliation  Yes  No   Anticoagulants (except Aspirin) [] [x]   Diabetic Medication  Metformin  [x] []   Weight loss medication (phentermine/vyvanse/saxsenda/etc)  Monjouro [x] []   Iron/herbal/multivitamin supplement(s)  Prenatal vitamin chew chewable  Zinc chewable [x] []   Usage of marijuana, CBD &/or vape product(s) [] [x]      STEPHEN Dawn MD   Physician  Gastroenterology     Operative Report  Addendum     Date of Service: 10/2/2018 10:50 AM       ESOPHAGOGASTRODUODENOSCOPY (EGD) & COLONOSCOPY REPORT           Anton DUARTE 1995 Age 23 year old    PCP STEPHANIE HOANG MD, MD Endoscopist Teddy Dawn MD      Date of procedure: 10/02/18     Procedure: EGD w/cold biopsy & Colonoscopy w/cold snare polypectomy + cold biopsy     Pre-operative diagnosis: GERD, change in bowels, SOB     Post-operative diagnosis: Normal egd, rectal polyp     Medications: MAC     Withdrawal time: 10 minutes     Complications: none     Procedure: Informed consent was obtained from the patient after the risks of the procedure were discussed, including but not limited to bleeding, perforation, aspiration, infection, or possibility of a missed lesion. We discussed the risks/benefits and alternatives to this procedure, as well as the planned sedation. EGD procedure: The patient was placed in the left lateral decubitus position and begun on continuous blood pressure pulse oximetry and EKG monitoring and this was maintained throughout the procedure. Once an adequate level of sedation was obtained a bite block was placed. Then the lubricated tip of the [id number] diagnostic video upper endoscope was inserted and advanced using direct visualization into the posterior pharynx and ultimately into the esophagus. Colonoscopy procedure: Once an adequate level of sedation was obtained a digital rectal exam was completed. Then the lubricated tip of the [id number] diagnostic video colonoscope was inserted and advanced without difficulty to the cecum using the air insufflation technique. The cecum was identified by localizing the trifold, the appendix and the ileocecal valve. A routine second examination of the cecum/ascending colon was performed. Withdrawal was begun with thorough washing and careful examination of the colonic walls and folds. Photodocumentation was obtained. The bowel prep was good. Views of the colon were good with washing. I then carefully withdrew the instrument from the patient who tolerated the procedure well.      Complications: None     EGD findings:        1. Esophagus: The squamocolumnar junction was noted at 36 cm and appeared regular. The GE junction was noted at 36 cm from the incisors. The esophageal mucosa appeared normal. There was no evidence of esophagitis, stricture or endoscopic evidence of Galvan's esophagus. Random biopsies taken from the proximal esophagus.  2. Stomach: The stomach distended normally. Normal rugal folds were seen. The pylorus was patent. The gastric mucosa appeared normal s/p random biopsies. Retroflexion revealed a normal fundus and a non-patulous cardia.   3. Duodenum: The duodenal mucosa appeared normal in the 1st and 2nd portion of the duodenum. S/p random biopsies.      Colonoscopy findings:     1. One polyp(s) noted as follows:      A. 5 mm polyp in the rectum of the colon; polypoid morphology; cold snare polypectomy and retrieved.  2. Diverticulosis: none.  3. Terminal ileum: the visualized mucosa appeared normal.  4. A retroflexed view of the rectum revealed no abnormalities other than the polyp mentioned above.  5. The colonic mucosa throughout the colon showed normal vascular pattern, without evidence of angioectasias or inflammation. Random biopsies obtained.  6. BRITTON: normal rectal tone, no masses palpated.      Impression:  Normal upper endoscopy. Suspect mild non-erosive reflux disease. No findings to explain SOB or neck discomfort.   Colonoscopy showed a small rectal polyp s/p removal. Random biopsies obtained to exclude microscopic colitis. If normal biopsies, suspect IBS-D.      Recommend:  Await pathology.   Continue pantoprazole as needed.   Follow-up in 6 months with Yuly Jean.   Follow-up with Dr. Qiu re: pulmonary findings/symptoms.   Monitor for blood in the stool. If having more than just tinge of blood, call office or go to the ER.     >>>If biopsies were performed and you have not received your pathology results either by phone or letter within 2 weeks, please call our office at [phone].      Specimens:  Gastric, duodenal, esophageal, colon                      Electronically signed by STEPHEN Dawn MD at 10/2/2018 10:53 AM      Final Diagnosis:      A. Duodenum; biopsy:  Duodenal mucosa without significant histopathology  Preserved villous architecture  No increase in intraepithelial lymphocytes     B. Random stomach; biopsy:  Gastric antral and oxyntic type mucosa without significant histopathology  Diff-quik stain (with reactive positive control) is negative for Helicobacter pylori-like organisms     C. Proximal esophagus; biopsy:  Squamous mucosa without significant histopathology  No intraepithelial eosinophils identified     D. Random colon; biopsy:  Colonic mucosa without significant histopathology  No evidence of active or microscopic colitis     E. Rectum polyp x1; biopsy:  Benign inflammatory type polyp

## 2025-01-13 NOTE — TELEPHONE ENCOUNTER
I spoke to the patient    Patient states that she moved and did not receive the recall letters that were sent to her house in 2023. Patient states that she signed into her [employer] account (first time in a while) and noticed she was over due for colonoscopy    Patient answered recall questions    Patient states that she is experiencing LLQ pain (started about 2 weeks ago). She is going to see her PCP today, but wanted to touch base with our office and schedule an appointment with Dr. Dawn    Patient scheduled for office visit on 1/30/2024 at 3:00 pm    Location, date and time verified with he patient    Patient verbalized understanding and has no further questions at this time    Your appointments       Date & Time Appointment Department (Center)    Jan 30, 2025 3:00 PM CST Consult with STEPHEN Dawn MD [facility] ([facility])

## 2025-01-14 NOTE — ED QUICK NOTES
Patient presents to ED 46 from triage with c/o left flank pain that began approx 10 days ago. Patient was seen in [facility] yesterday, told she may have a UTI and given abx which patient did not start. Patient then saw her PCP today who sent her to ED r/o kidney stone. Patient states she has taken Tylenol with no improvement. Patient denies hematuria/dysuria. Patient is A&O x 4. No distress noted. Respirations regular and unlabored. Call light at reach.

## 2025-01-14 NOTE — ED INITIAL ASSESSMENT (HPI)
Pt arrives to triage with c/o left flank plain x 10 days ago. States went to PCP today for flank pain, tested urine and wants pt to r/o UTI vs kidney infection.

## 2025-01-14 NOTE — ED PROVIDER NOTES
Patient Seen in: [facility] Emergency Department    History     Chief Complaint   Patient presents with    Abdomen/Flank Pain       HPI    29-year-old female with a history of diabetes who presents ER today complaint of left flank pain has been on and off for the past few days.  Patient was already seen by immediate care who states she may have a bladder infection and went to her primary care provider today was concerned there is blood in her urine and was told to come to the ER.  Denies any dysuria.  No chest pain or shortness of breath    History reviewed.   Past Medical History:    Anxiety state    Colon polyp    repeat CLN in 5 years    Diabetes mellitus type 2, uncomplicated (HCC)    High cholesterol    Hyperlipidemia    Polycystic ovaries    PONV (postoperative nausea and vomiting)    Shortness of breath    Visual impairment       History reviewed.   Past Surgical History:   Procedure Laterality Date    Colonoscopy N/A 10/2/2018    Procedure: COLONOSCOPY;  Surgeon: STEPHEN Dawn MD;  Location: [facility] ENDOSCOPY    Tonsillectomy  07/2016         Medications :  Prescriptions Prior to Admission[1]     Family History   Problem Relation Age of Onset    Diabetes Father     Cancer Father 64        Lymphoma     Lipids Brother         hyperlipidemia    Other (Hyperlipidemia) Brother     Diabetes Other        Smoking Status:   Social History     Socioeconomic History    Marital status:    Tobacco Use    Smoking status: Never    Smokeless tobacco: Never   Vaping Use    Vaping status: Never Used   Substance and Sexual Activity    Alcohol use: No     Alcohol/week: 0.0 standard drinks of alcohol     Comment: None    Drug use: No     Comment: none    Sexual activity: Yes     Birth control/protection: OCP     Comment: none   Other Topics Concern    Caffeine Concern No    Exercise No    Pt has a pacemaker No    Pt has a defibrillator No    Reaction to local anesthetic No       Constitutional and vital signs  reviewed.      Social History and Family History elements reviewed from today, pertinent positives to the presenting problem noted.    Physical Exam     ED Triage Vitals   BP 01/13/25 1916 130/80   Pulse 01/13/25 1916 117   Resp 01/13/25 1916 18   Temp 01/13/25 1913 98 °F (36.7 °C)   Temp src 01/13/25 1913 Temporal   SpO2 01/13/25 1916 100 %   O2 Device 01/13/25 1916 None (Room air)       All measures to prevent infection transmission during my interaction with the patient were taken. Handwashing was performed prior to and after the exam.  Stethoscope and any equipment used during my examination was cleaned with super sani-cloth germicidal wipes following the exam.     Physical Exam  Vitals and nursing note reviewed.   Cardiovascular:      Rate and Rhythm: Normal rate.      Heart sounds: Normal heart sounds.   Abdominal:      Palpations: Abdomen is soft.      Tenderness: There is left CVA tenderness.   Skin:     General: Skin is warm and dry.   Neurological:      General: No focal deficit present.      Mental Status: She is alert.         ED Course        Labs Reviewed   COMP METABOLIC PANEL (14) - Abnormal; Notable for the following components:       Result Value    Glucose 163 (*)     All other components within normal limits   CBC WITH DIFFERENTIAL WITH PLATELET - Abnormal; Notable for the following components:    WBC 15.6 (*)     RBC 5.46 (*)     MCV 79.1 (*)     Neutrophil Absolute Prelim 10.86 (*)     Neutrophil Absolute 10.86 (*)     All other components within normal limits   URINALYSIS WITH CULTURE REFLEX - Abnormal; Notable for the following components:    Leukocyte Esterase Urine 250 (*)     WBC Urine 6-10 (*)     Squamous Epi. Cells Few (*)     All other components within normal limits   LIPASE - Normal   POCT PREGNANCY URINE - Normal   URINE CULTURE, ROUTINE       As Interpreted by me    Imaging Results Available and Reviewed while in ED: No results found.  ED Medications Administered: Medications - No  data to display      [facility]     Vitals:    01/13/25 1913 01/13/25 1916   BP:  130/80   Pulse:  117   Resp:  18   Temp: 98 °F (36.7 °C)    TempSrc: Temporal    SpO2:  100%   Weight:  122.5 kg   Height:  170.2 cm (5' 7\")     *I personally reviewed and interpreted all ED vitals.    Pulse Ox: 100%, Room air, Normal       Differential Diagnosis/ Diagnostic Considerations: Renal stones, pyelonephritis, UTI, musculoskeletal back pain    Complicating Factors: The patient already has does not have any pertinent problems on file. to contribute to the complexity of this ED evaluation.    Medical Decision Making  Amount and/or Complexity of Data Reviewed  Labs: ordered. Decision-making details documented in ED Course.  Radiology: ordered. Decision-making details documented in ED Course.    Risk  OTC drugs.  Prescription drug management.      I reviewed all results with patient.  Patient may have a mild infection.  Labs show a slightly elevated leukocytosis but otherwise nothing else acute.  No signs of infection.  CT also did not show anything acute.  Explained the patient this is most likely musculoskeletal back pain along with an early UTI.  Patient was already prescribed Macrobid for the UTI.  I explained the patient to pick that prescription up and start taking it.  I will discharge her home with ibuprofen, Flexeril, lidocaine patches for the back pain.  Patient understands to take all these medications as prescribed.  Follow-up with her primary care provider in 1 to 2 days.  Return to the ER if some continue, get worse, unable to follow-up  Condition upon leaving the department: Stable    Disposition and Plan     Clinical Impression:  1. Acute left-sided low back pain without sciatica    2. Acute cystitis without hematuria        Disposition:  Discharge    Follow-up:  Angela Hernandez MD  [address] [place] [id number]  [phone]    Follow up        Medications Prescribed:  Current Discharge Medication List         START taking these medications    Details   cyclobenzaprine 10 MG Oral Tab Take 1 tablet (10 mg total) by mouth 3 (three) times daily as needed for Muscle spasms.  Qty: 20 tablet, Refills: 0      ibuprofen 600 MG Oral Tab Take 1 tablet (600 mg total) by mouth every 8 (eight) hours as needed for Pain or Fever.  Qty: 20 tablet, Refills: 0      lidocaine 5 % External Patch Place 1 patch onto the skin daily.  Qty: 10 patch, Refills: 0                              [1] (Not in a hospital admission)

## 2025-01-16 RX ORDER — CEPHALEXIN 500 MG/1
500 CAPSULE ORAL 4 TIMES DAILY
Qty: 28 CAPSULE | Refills: 0 | Status: SHIPPED | OUTPATIENT
Start: 2025-01-16 | End: 2025-01-23

## 2025-01-30 ENCOUNTER — TELEPHONE (OUTPATIENT)
Facility: CLINIC | Age: 30
End: 2025-01-30

## 2025-01-30 NOTE — TELEPHONE ENCOUNTER
Spoke to patient and confirmed appt. Location/date/time details provided.      Your appointments       Date & Time Appointment Department (Center)    Feb 27, 2025 3:00 PM CST Follow Up Visit with STEPHEN Dawn MD [facility] ([facility])

## 2025-01-30 NOTE — TELEPHONE ENCOUNTER
Dr. Dawn,    Patient unable to come in today due to family emergency. Asking to see you sooner than next available.     Only would like to see you.

## 2025-01-30 NOTE — TELEPHONE ENCOUNTER
Patient had to cancel her appointment today due to father currently at the hospital, family emergency. Patient has left side pain and informed no openings until May. Patient would like to schedule only with Dr. Dawn. Please call at [phone],thanks.   *see closed telephone encounter 1/13

## 2025-02-27 ENCOUNTER — OFFICE VISIT (OUTPATIENT)
Facility: CLINIC | Age: 30
End: 2025-02-27

## 2025-02-27 VITALS
DIASTOLIC BLOOD PRESSURE: 81 MMHG | SYSTOLIC BLOOD PRESSURE: 116 MMHG | HEART RATE: 123 BPM | HEIGHT: 67 IN | BODY MASS INDEX: 45.99 KG/M2 | WEIGHT: 293 LBS

## 2025-02-27 DIAGNOSIS — R14.0 ABDOMINAL BLOATING: ICD-10-CM

## 2025-02-27 DIAGNOSIS — R11.0 NAUSEA: Primary | ICD-10-CM

## 2025-02-27 PROCEDURE — 3079F DIAST BP 80-89 MM HG: CPT | Performed by: INTERNAL MEDICINE

## 2025-02-27 PROCEDURE — 99203 OFFICE O/P NEW LOW 30 MIN: CPT | Performed by: INTERNAL MEDICINE

## 2025-02-27 PROCEDURE — 3008F BODY MASS INDEX DOCD: CPT | Performed by: INTERNAL MEDICINE

## 2025-02-27 PROCEDURE — 3074F SYST BP LT 130 MM HG: CPT | Performed by: INTERNAL MEDICINE

## 2025-02-27 RX ORDER — MEDROXYPROGESTERONE ACETATE 10 MG
10 TABLET ORAL
COMMUNITY
Start: 2025-01-31

## 2025-02-27 NOTE — H&P
[facility] - Gastroenterology                                                                                                               Reason for consult: ab pain    Requesting physician or provider: STEPHANIE HOANG MD, MD    Chief Complaint   Patient presents with    Follow - Up       HPI:   Anton Salazar is a 29 year old female patient with obesity, anxiety, PCOS, elevated LFTs and pulmonary nodules (s/p biopsy, seen by Dr. Qiu) who presents for follow up of ab discomfort.     Patient has been lost to follow-up, last seen 6 years ago.  -Went to ER in Jan 2025 for L flank pain, Patient denies hematuria/dysuria.   Tx for UTI  No kidney stones  No abnormal GI system on CT  -flank pain resolved  -on mounjaro - 5mg/week  -seeing dietician  -some nausea and bloating on weight loss medication            Last in 2019  Very pleasant young woman who has been dealing with chest discomfort issues which initially thought were GERD but despite high-dose PPI use she is not improved.  She had upper endoscopy and colonoscopy that showed no significant findings.  She had no endoscopic esophagitis or strictures or webs in her esophagus.  She says her chest discomfort seems to be all the time sometimes not even with eating.  She has had both pulmonary and cardiac workup.  Her stress test was normal, no arrhythmias, no chest pain.     She admits to stress and anxiety which sometimes make her symptoms worse.  Denies any nausea, vomiting, dysphagia.  No melena or hematochezia.  No abdominal pain.  Her bowels seem to have normalized.     EGD/CLN (2018)  Normal upper endoscopy. Suspect mild non-erosive reflux disease. No findings to explain SOB or neck discomfort.   Colonoscopy showed a small rectal polyp s/p removal. Random biopsies obtained to exclude microscopic colitis. If normal biopsies, suspect IBS-D.   PATH: neg Hpylori, normal  duodenum, normal esophageal biopsies. Benign rectal inflammatory polyp              Pertinent Family Hx:  - No known history of esophageal, gastric or colon cancers.  - No known history of liver problems/cancers.  - No known history of IBD.     Pertinent Social Hx:  - No tobacco use/No ETOH  - No NSAIDs/daily ASA  - Lives with:  - Occupation:        [employer] (Jan 2025)  Normal liver and gallbladder      Normal pancreas, spleen, adrenals      Normal-size kidneys with no hydronephrosis or calculi      Normal size aorta.  No adenopathy or ascites      Unobstructed bowel.  Normal appendix      No adnexal mass.  Decompressed bladder      No fracture      Impression:  No acute or concerning findings         Wt Readings from Last 6 Encounters:   01/13/25 270 lb (122.5 kg)   08/09/24 270 lb (122.5 kg)   05/28/24 280 lb (127 kg)   12/26/21 290 lb (131.5 kg)   06/24/21 (!) 301 lb (136.5 kg)   06/23/21 (!) 316 lb 2.2 oz (143.4 kg)        History, Medications, Allergies, ROS:      Past Medical History:    Anxiety state    Colon polyp    repeat CLN in 5 years    Diabetes mellitus type 2, uncomplicated (HCC)    High cholesterol    Hyperlipidemia    Polycystic ovaries    PONV (postoperative nausea and vomiting)    Shortness of breath    Visual impairment      Past Surgical History:   Procedure Laterality Date    Colonoscopy N/A 10/2/2018    Procedure: COLONOSCOPY;  Surgeon: STEPHEN Dawn MD;  Location: [facility] ENDOSCOPY    Tonsillectomy  07/2016      Family Hx:   Family History   Problem Relation Age of Onset    Diabetes Father     Cancer Father 64        Lymphoma     Prostate Cancer Father     Lipids Brother         hyperlipidemia    Other (Hyperlipidemia) Brother     Diabetes Other       Social History:   Social History     Socioeconomic History    Marital status:    Tobacco Use    Smoking status: Never    Smokeless tobacco: Never   Vaping Use    Vaping status: Never Used   Substance and Sexual Activity    Alcohol use: No      Alcohol/week: 0.0 standard drinks of alcohol     Comment: None    Drug use: No     Comment: none    Sexual activity: Yes     Birth control/protection: OCP     Comment: none   Other Topics Concern    Caffeine Concern No    Exercise No    Pt has a pacemaker No    Pt has a defibrillator No    Reaction to local anesthetic No        Medications (Active prior to today's visit):  Current Outpatient Medications   Medication Sig Dispense Refill    Tirzepatide 7.5 MG/0.5ML Subcutaneous Solution Auto-injector Inject 7.5 mg into the skin once a week.      lidocaine 5 % External Patch Place 1 patch onto the skin daily. 10 patch 0    Alcohol Swabs (ALCOHOL PREP) 70 % Does not apply Pads U BID.      Blood Glucose Monitoring Suppl (ONETOUCH ULTRA 2) w/Device Does not apply Kit FOR HOME USE BID.      EPINEPHrine 0.3 MG/0.3ML Injection Solution Auto-injector Inject 0.3 mL (1 each total) into the muscle as needed.      ONETOUCH ULTRA In Vitro Strip TEST BID.      Lancets (ONETOUCH DELICA PLUS [id number]) Does not apply Misc       lisinopril 2.5 MG Oral Tab       metFORMIN HCl 500 MG Oral Tab       Rosuvastatin Calcium 5 MG Oral Tab       Norethindrone (ORTHO MICRONOR) 0.35 MG Oral Tab Take 1 tablet (0.35 mg total) by mouth daily. (Patient not taking: Reported on 1/13/2025) 3 Package 4       Allergies:  Allergies[1]    ROS:   CONSTITUTIONAL:  negative for fevers, rigors  EYES:  negative for diplopia   RESPIRATORY:  negative for severe shortness of breath  CARDIOVASCULAR:  negative for crushing sub-sternal chest pain  GASTROINTESTINAL:  see HPI  GENITOURINARY:  negative for dysuria or gross hematuria  INTEGUMENT/BREAST:  SKIN:  negative for jaundice   ALLERGIC/IMMUNOLOGIC:  negative for hay fever  ENDOCRINE:  negative for cold intolerance and heat intolerance  MUSCULOSKELETAL:  negative for joint effusion/severe erythema  BEHAVIOR/PSYCH:  negative for psychotic behavior      PHYSICAL EXAM:   Last menstrual period 01/05/2025, not  currently breastfeeding.    Gen- Patient appears comfortable and in no acute discomfort  HEENT: the sclera appears anicteric, oropharynx clear, mucus membranes appear moist  CV- regular rate and rhythm, the extremities are warm and well perfused   Lung- Moves air well; No labored breathing  Abdomen- soft, non-tender exam in all quadrants without rigidity or guarding, non-distended, no abnormal bowel sounds noted, no masses are palpated  Skin- No jaundice  Ext: no cyanosis, clubbing or edema is evident.   Neuro- Alert and interactive, and gross movements of extremities normal  Psych - appropriate, non-agitated    Labs/Imaging:     Patient's pertinent labs and imaging were reviewed and discussed with patient today.      ASSESSMENT/PLAN:   Anton Salazar is a 29 year old female patient with obesity, anxiety, PCOS, elevated LFTs and pulmonary nodules (s/p biopsy, seen by Dr. Qiu) who presents for follow up of ab discomfort.    #L flank pain- CT reviewed, no active issues, pain resolved. Suspect MSK related.    #Nausea/bloating- likely 2/2 monjaro use, tips given re: how she can minimize her sx.    Recommendations:    Stretching exercises  Continue weight loss injections  Consider zofran as needed  Monitor bowel movements   Consider kombucha or kefir as part of probiotics in your diet        Orders This Visit:  No orders of the defined types were placed in this encounter.      Meds This Visit:  Requested Prescriptions      No prescriptions requested or ordered in this encounter       Imaging & Referrals:  None         HECTOR Dawn MD  Pager: [phone]  2/27/2025        This note was partially prepared using Dragon Medical voice recognition dictation software. As a result, errors may occur. When identified, these errors have been corrected. While every attempt is made to correct errors during dictation, discrepancies may still exist.          [1]   Allergies  Allergen Reactions    Amoxicillin HIVES

## 2025-02-27 NOTE — PATIENT INSTRUCTIONS
Stretching exercises  Continue weight loss injections  Consider zofran as needed  Monitor bowel movements   Consider kombucha or kefir as part of probiotics in your diet

## 2025-02-28 PROBLEM — R11.0 NAUSEA: Status: ACTIVE | Noted: 2025-02-28

## 2025-02-28 PROBLEM — R14.0 ABDOMINAL BLOATING: Status: ACTIVE | Noted: 2025-02-28

## 2025-04-22 NOTE — TELEPHONE ENCOUNTER
Pt called to give Dr. Edin Arreguin an update on how she is feeling. No other details given. Please call.
Pt informed of orders, given phone # to central scheduling [phone], voiced understanding.
Pt stts she is feeling a little better in regards to her nasuea however she is still having a lot of shortness of breathe. She reports new symptom of \"tugging\" sensation on heart x 2 last weeks but x 2 last night really bad.  Phone Call transferred to 
RN, I spoke to the patient. Please facilitate full pulmonary function testing and treadmill stress thallium. Please contact the patient back again explained how to get these done.
5

## 2025-04-24 ENCOUNTER — HOSPITAL ENCOUNTER (EMERGENCY)
Facility: HOSPITAL | Age: 30
Discharge: HOME | End: 2025-04-24
Attending: EMERGENCY MEDICINE
Payer: COMMERCIAL

## 2025-04-24 VITALS
TEMPERATURE: 98 F | RESPIRATION RATE: 18 BRPM | HEART RATE: 101 BPM | BODY MASS INDEX: 46 KG/M2 | WEIGHT: 293 LBS | SYSTOLIC BLOOD PRESSURE: 130 MMHG | DIASTOLIC BLOOD PRESSURE: 75 MMHG | OXYGEN SATURATION: 97 %

## 2025-04-24 DIAGNOSIS — R51.9 ACUTE NONINTRACTABLE HEADACHE, UNSPECIFIED HEADACHE TYPE: Primary | ICD-10-CM

## 2025-04-24 LAB
ANION GAP SERPL CALC-SCNC: 6 MMOL/L (ref 0–18)
B-HCG UR QL: NEGATIVE
BUN BLD-MCNC: 13 MG/DL (ref 9–23)
BUN/CREAT SERPL: 16 (ref 10–20)
CALCIUM BLD-MCNC: 9.4 MG/DL (ref 8.7–10.4)
CHLORIDE SERPL-SCNC: 101 MMOL/L (ref 98–112)
CO2 SERPL-SCNC: 27 MMOL/L (ref 21–32)
CREAT BLD-MCNC: 0.81 MG/DL (ref 0.55–1.02)
EGFRCR SERPLBLD CKD-EPI 2021: 99 ML/MIN/1.73M2 (ref 60–?)
GLUCOSE BLD-MCNC: 210 MG/DL (ref 70–99)
OSMOLALITY SERPL CALC.SUM OF ELEC: 284 MOSM/KG (ref 275–295)
POTASSIUM SERPL-SCNC: 4.3 MMOL/L (ref 3.5–5.1)
SODIUM SERPL-SCNC: 134 MMOL/L (ref 136–145)

## 2025-04-24 PROCEDURE — 96361 HYDRATE IV INFUSION ADD-ON: CPT

## 2025-04-24 PROCEDURE — 85025 COMPLETE CBC W/AUTO DIFF WBC: CPT | Performed by: EMERGENCY MEDICINE

## 2025-04-24 PROCEDURE — 99284 EMERGENCY DEPT VISIT MOD MDM: CPT

## 2025-04-24 PROCEDURE — 85060 BLOOD SMEAR INTERPRETATION: CPT | Performed by: EMERGENCY MEDICINE

## 2025-04-24 PROCEDURE — 80048 BASIC METABOLIC PNL TOTAL CA: CPT | Performed by: EMERGENCY MEDICINE

## 2025-04-24 PROCEDURE — 96374 THER/PROPH/DIAG INJ IV PUSH: CPT

## 2025-04-24 PROCEDURE — 96375 TX/PRO/DX INJ NEW DRUG ADDON: CPT

## 2025-04-24 PROCEDURE — 81025 URINE PREGNANCY TEST: CPT

## 2025-04-24 RX ORDER — KETOROLAC TROMETHAMINE 15 MG/ML
15 INJECTION, SOLUTION INTRAMUSCULAR; INTRAVENOUS ONCE
Status: COMPLETED | OUTPATIENT
Start: 2025-04-24 | End: 2025-04-24

## 2025-04-24 RX ORDER — METOCLOPRAMIDE HYDROCHLORIDE 5 MG/ML
10 INJECTION INTRAMUSCULAR; INTRAVENOUS ONCE
Status: COMPLETED | OUTPATIENT
Start: 2025-04-24 | End: 2025-04-24

## 2025-04-24 RX ORDER — DIPHENHYDRAMINE HYDROCHLORIDE 50 MG/ML
25 INJECTION, SOLUTION INTRAMUSCULAR; INTRAVENOUS ONCE
Status: COMPLETED | OUTPATIENT
Start: 2025-04-24 | End: 2025-04-24

## 2025-04-24 NOTE — ED PROVIDER NOTES
Patient Seen in: [facility] Emergency Department    History     Chief Complaint   Patient presents with    Headache     Stated Complaint: Headache    HPI  29-year-old female with type 2 diabetes on Mounjaro presents for evaluation of headache.  The headache began about 5 days ago.  Its associated with nausea, dizziness, and constant pressure at the back of the head.  She has not been able to go to work.  No vomiting or diarrhea.  No fever.  No trauma.  No neck symptoms or back rash.  No numbness tingling weakness in arms or legs.  No improvement with Tylenol, Excedrin or ibuprofen.  She drove herself to the ED today.  She has had 2 similar headaches in the past.  Additionally she adds that after taking a month off of Mounjaro she restarted at her prior dose and voiced concern that her headache could be a side effect of the medication.  Past Medical History[1]    Past Surgical History[2]         Family History[3]    Short Social Hx on File[4]    Review of Systems    Positive for stated complaint: Headache  Other systems are as noted in HPI.  Constitutional and vital signs reviewed.      All other systems reviewed and negative except as noted above.    PSFH elements reviewed from today and agreed except as otherwise stated in HPI.    Physical Exam     ED Triage Vitals [04/24/25 1303]   /75   Pulse 113   Resp 22   Temp 98 °F (36.7 °C)   Temp src Temporal   SpO2 98 %   O2 Device None (Room air)       Current:/75   Pulse 101   Temp 98 °F (36.7 °C) (Temporal)   Resp 18   Wt 134.3 kg   LMP  (Approximate)   SpO2 97%   BMI 46.37 kg/m²   PULSE OX 98% room air  Constitutional:  No acute distress  HEENT:  Head normocephalic and atraumatic. No scleral icterus or erythema. Pharynx moist without erythema or exudate.  CV:  Regular rate and rhythm. No murmur. Peripheral pulses intact.  Respiratory:  Lungs clear to auscultation bilaterally  Abdomen:  Soft, non-tender, non-distended.  Back:  No CVA or  vertebral tenderness  Skin:  Normal color. Warm and Dry  Extremities:  Non-tender. No pedal edema.   Neuro:  Oriented x3.  PERRL, EOMI. CN II - XII grossly intact.  No gross motor deficits.  5/5 strength in all distribution.  Sensation fully intact.      DDX to include tension headache vs. Migraine headache vs. Sinusitis vs. SAH        ED Course     Labs Reviewed   BASIC METABOLIC PANEL (8) - Abnormal; Notable for the following components:       Result Value    Glucose 210 (*)     Sodium 134 (*)     All other components within normal limits   CBC WITH DIFFERENTIAL WITH PLATELET - Abnormal; Notable for the following components:    WBC 14.2 (*)     RBC 5.79 (*)     Neutrophil Absolute Prelim 9.90 (*)     All other components within normal limits   POCT PREGNANCY URINE - Normal   MD BLOOD SMEAR CONSULT       MDM     Medical Decision Making    The patient is well-appearing and ambulatory.  No focal neurologic deficits.  Vitals are stable.  CBC with ongoing leukocytosis compared to prior CBC from 1/13/2025.  Chemistry with hyperglycemia without anion gap or acidosis.  Patient reports she ate a yogurt just prior to arrival.  Patient treated with migraine cocktail and normal saline bolus.      Re-Exam: 5:10 PM, headache improving, there is slight drowsiness.  Advised follow-up with neurology, return precautions provided and discussed.    Patient presenting with headache.  Patient  with no abnormal symptoms for patient.  Patient was discharged home. Patient advised to return to ER if alteration in mental status, onset of fevers, visual changes, or peripheral weakness/numbness/tingling.    Problems Addressed:  Acute nonintractable headache, unspecified headache type: complicated acute illness or injury with systemic symptoms    Amount and/or Complexity of Data Reviewed  External Data Reviewed: radiology.     Details: Reviewed CT brain report from 8/10/2024 which showed no acute process  Labs: ordered. Decision-making details  documented in ED Course.      Disposition and Plan     Clinical Impression:  1. Acute nonintractable headache, unspecified headache type        Disposition:  Discharge    Follow-up:  Chance Russell MD  [address] [place] [address]  [phone]    Follow up        Medications Prescribed:  Current Discharge Medication List                         [1]   Past Medical History:   Anxiety state    Colon polyp    repeat CLN in 5 years    Diabetes mellitus type 2, uncomplicated (HCC)    High cholesterol    Hyperlipidemia    Polycystic ovaries    PONV (postoperative nausea and vomiting)    Shortness of breath    Visual impairment   [2]   Past Surgical History:  Procedure Laterality Date    Colonoscopy N/A 10/2/2018    Procedure: COLONOSCOPY;  Surgeon: STEPHEN Dawn MD;  Location: [facility] ENDOSCOPY    Tonsillectomy  07/2016   [3]   Family History  Problem Relation Age of Onset    Diabetes Father     Cancer Father 64        Lymphoma     Prostate Cancer Father     Lipids Brother         hyperlipidemia    Other (Hyperlipidemia) Brother     Diabetes Other    [4]   Social History  Socioeconomic History    Marital status:    Tobacco Use    Smoking status: Never    Smokeless tobacco: Never   Vaping Use    Vaping status: Never Used   Substance and Sexual Activity    Alcohol use: No     Alcohol/week: 0.0 standard drinks of alcohol     Comment: None    Drug use: No     Comment: none    Sexual activity: Yes     Birth control/protection: OCP     Comment: none   Other Topics Concern    Caffeine Concern No    Exercise No    Pt has a pacemaker No    Pt has a defibrillator No    Reaction to local anesthetic No

## 2025-04-24 NOTE — ED INITIAL ASSESSMENT (HPI)
Pt to ED with c/o worsening headache for 5 days. Pt states no relief from tylenol, Excedrin, or Ibuprofen today. Pt denies visual changes. Pt states she drove herself here today. Pt denies vomiting or diarrhea. Denies cough or fever. No respiratory distress noted. Pt is alert and oriented x4. Speech clear. Face symmetrical. Tongue midline. Pt ambulating by self with steady gait.

## 2025-04-25 LAB
BASOPHILS # BLD AUTO: 0.06 X10(3) UL (ref 0–0.2)
BASOPHILS NFR BLD AUTO: 0.4 %
DEPRECATED RDW RBC AUTO: 37.8 FL (ref 35.1–46.3)
EOSINOPHIL # BLD AUTO: 0.07 X10(3) UL (ref 0–0.7)
EOSINOPHIL NFR BLD AUTO: 0.5 %
ERYTHROCYTE [DISTWIDTH] IN BLOOD BY AUTOMATED COUNT: 13.2 % (ref 11–15)
HCT VFR BLD AUTO: 46.3 % (ref 35–48)
HGB BLD-MCNC: 16 G/DL (ref 12–16)
IMM GRANULOCYTES # BLD AUTO: 0.08 X10(3) UL (ref 0–1)
IMM GRANULOCYTES NFR BLD: 0.6 %
LYMPHOCYTES # BLD AUTO: 3.5 X10(3) UL (ref 1–4)
LYMPHOCYTES NFR BLD AUTO: 24.6 %
MCH RBC QN AUTO: 27.6 PG (ref 26–34)
MCHC RBC AUTO-ENTMCNC: 34.6 G/DL (ref 31–37)
MCV RBC AUTO: 80 FL (ref 80–100)
MONOCYTES # BLD AUTO: 0.62 X10(3) UL (ref 0.1–1)
MONOCYTES NFR BLD AUTO: 4.4 %
NEUTROPHILS # BLD AUTO: 9.9 X10 (3) UL (ref 1.5–7.7)
NEUTROPHILS # BLD AUTO: 9.9 X10(3) UL (ref 1.5–7.7)
NEUTROPHILS NFR BLD AUTO: 69.5 %
PLATELET # BLD AUTO: 351 10(3)UL (ref 150–450)
RBC # BLD AUTO: 5.79 X10(6)UL (ref 3.8–5.3)
WBC # BLD AUTO: 14.2 X10(3) UL (ref 4–11)

## 2025-04-27 ENCOUNTER — HOSPITAL ENCOUNTER (EMERGENCY)
Facility: HOSPITAL | Age: 30
Discharge: HOME | End: 2025-04-27
Attending: EMERGENCY MEDICINE
Payer: COMMERCIAL

## 2025-04-27 VITALS
WEIGHT: 280 LBS | HEIGHT: 66 IN | BODY MASS INDEX: 45 KG/M2 | OXYGEN SATURATION: 98 % | SYSTOLIC BLOOD PRESSURE: 109 MMHG | DIASTOLIC BLOOD PRESSURE: 65 MMHG | HEART RATE: 68 BPM | RESPIRATION RATE: 19 BRPM | TEMPERATURE: 97 F

## 2025-04-27 DIAGNOSIS — R51.9 NONINTRACTABLE HEADACHE, UNSPECIFIED CHRONICITY PATTERN, UNSPECIFIED HEADACHE TYPE: Primary | ICD-10-CM

## 2025-04-27 LAB
ANION GAP SERPL CALC-SCNC: 9 MMOL/L (ref 0–18)
BUN BLD-MCNC: 10 MG/DL (ref 9–23)
BUN/CREAT SERPL: 11.6 (ref 10–20)
CALCIUM BLD-MCNC: 9.7 MG/DL (ref 8.7–10.4)
CHLORIDE SERPL-SCNC: 102 MMOL/L (ref 98–112)
CO2 SERPL-SCNC: 26 MMOL/L (ref 21–32)
CREAT BLD-MCNC: 0.86 MG/DL (ref 0.55–1.02)
EGFRCR SERPLBLD CKD-EPI 2021: 93 ML/MIN/1.73M2 (ref 60–?)
GLUCOSE BLD-MCNC: 176 MG/DL (ref 70–99)
OSMOLALITY SERPL CALC.SUM OF ELEC: 287 MOSM/KG (ref 275–295)
POTASSIUM SERPL-SCNC: 4.2 MMOL/L (ref 3.5–5.1)
SODIUM SERPL-SCNC: 137 MMOL/L (ref 136–145)

## 2025-04-27 PROCEDURE — 80048 BASIC METABOLIC PNL TOTAL CA: CPT | Performed by: EMERGENCY MEDICINE

## 2025-04-27 PROCEDURE — 99284 EMERGENCY DEPT VISIT MOD MDM: CPT

## 2025-04-27 PROCEDURE — 96375 TX/PRO/DX INJ NEW DRUG ADDON: CPT

## 2025-04-27 PROCEDURE — 96361 HYDRATE IV INFUSION ADD-ON: CPT

## 2025-04-27 PROCEDURE — 96374 THER/PROPH/DIAG INJ IV PUSH: CPT

## 2025-04-27 RX ORDER — ACETAMINOPHEN AND CODEINE PHOSPHATE 300; 30 MG/1; MG/1
1 TABLET ORAL EVERY 6 HOURS PRN
Qty: 15 TABLET | Refills: 0 | Status: SHIPPED | OUTPATIENT
Start: 2025-04-27 | End: 2025-05-02

## 2025-04-27 RX ORDER — ONDANSETRON 2 MG/ML
4 INJECTION INTRAMUSCULAR; INTRAVENOUS ONCE
Status: COMPLETED | OUTPATIENT
Start: 2025-04-27 | End: 2025-04-27

## 2025-04-27 RX ORDER — DEXAMETHASONE 4 MG/1
8 TABLET ORAL
Qty: 6 TABLET | Refills: 0 | Status: SHIPPED | OUTPATIENT
Start: 2025-04-27 | End: 2025-04-30

## 2025-04-27 RX ORDER — DIPHENHYDRAMINE HYDROCHLORIDE 50 MG/ML
25 INJECTION, SOLUTION INTRAMUSCULAR; INTRAVENOUS ONCE
Status: COMPLETED | OUTPATIENT
Start: 2025-04-27 | End: 2025-04-27

## 2025-04-27 RX ORDER — KETOROLAC TROMETHAMINE 15 MG/ML
15 INJECTION, SOLUTION INTRAMUSCULAR; INTRAVENOUS ONCE
Status: COMPLETED | OUTPATIENT
Start: 2025-04-27 | End: 2025-04-27

## 2025-04-27 RX ORDER — ONDANSETRON 4 MG/1
4 TABLET, ORALLY DISINTEGRATING ORAL EVERY 4 HOURS PRN
Qty: 12 TABLET | Refills: 0 | Status: SHIPPED | OUTPATIENT
Start: 2025-04-27 | End: 2025-05-04

## 2025-04-27 RX ORDER — MORPHINE SULFATE 4 MG/ML
4 INJECTION, SOLUTION INTRAMUSCULAR; INTRAVENOUS ONCE
Status: DISCONTINUED | OUTPATIENT
Start: 2025-04-27 | End: 2025-04-27

## 2025-04-27 RX ORDER — DEXAMETHASONE SODIUM PHOSPHATE 4 MG/ML
8 VIAL (ML) INJECTION ONCE
Status: COMPLETED | OUTPATIENT
Start: 2025-04-27 | End: 2025-04-27

## 2025-04-27 NOTE — ED PROVIDER NOTES
Patient Seen in: [facility] Emergency Department    History     Chief Complaint   Patient presents with    Headache     Stated Complaint: Bounceback, Headache    HPI  Pt c/o a 9/10 headache that began 10 days ago.  .  This is simialr to previous headaches.  Pain is described as diffuse now going into ears.  Was seen for this few days ago.  no fever, no stiff neck, no visual changes and no focal neuro deficit.  Seen primary given baclofen not helping.  Similar headache months ago had nl head CT.     Past Medical History[1]    Past Surgical History[2]         Family History[3]    Short Social Hx on File[4]    Review of Systems    Positive for stated complaint: Bounceback, Headache  Other systems are as noted in HPI.  Constitutional and vital signs reviewed.      All other systems reviewed and negative except as noted above.    PSFH elements reviewed from today and agreed except as otherwise stated in HPI.    Physical Exam     ED Triage Vitals [04/27/25 1415]   BP (!) 140/94   Pulse 91   Resp 16   Temp 97 °F (36.1 °C)   Temp src Temporal   SpO2 98 %   O2 Device None (Room air)       Current:BP (!) 140/94   Pulse 91   Temp 97 °F (36.1 °C) (Temporal)   Resp 16   Ht 167.6 cm (5' 6\")   Wt 127 kg   LMP 04/11/2025 (Approximate)   SpO2 98%   BMI 45.19 kg/m²   PULSE OX nl  Constitutional:  No acute distress  HEENT:  Head normocephalic and atraumatic. No scleral icterus or erythema. Pharynx moist without erythema or exudate.  CV:  Regular rate and rhythm. No murmur. Peripheral pulses intact.  Respiratory:  Lungs clear to auscultation bilaterally  Abdomen:  Soft, non-tender, non-distended.  Back:  No CVA or vertebral tenderness  Skin:  Normal color. Warm and Dry  Extremities:  Non-tender. No pedal edema.   Neuro:  Oriented x3.  PERRL, EOMI. CN II - XII grossly intact.  No gross motor deficits.  5/5 strength in all distribution.  Sensation fully intact.      DDX to include tension headache vs. Migraine headache  vs. Sinusitis vs. SAH        ED Course   Labs Reviewed - No data to display    MDM     MDM      Re-Exam: ***    Patient presenting with headache.  Patient  with no abnormal symptoms for patient.  Patient was discharged home. Patient advised to return to ER if alteration in mental status, onset of fevers, visual changes, or peripheral weakness/numbness/tingling.  Disposition and Plan     Clinical Impression:  No diagnosis found.    Disposition:  There is no disposition on file for this visit.    Follow-up:  No follow-up provider specified.    Medications Prescribed:  Current Discharge Medication List                         [1]   Past Medical History:   Anxiety state    Colon polyp    repeat CLN in 5 years    Diabetes mellitus type 2, uncomplicated (HCC)    High cholesterol    Hyperlipidemia    Obesity    Polycystic ovaries    PONV (postoperative nausea and vomiting)    Shortness of breath    Visual impairment   [2]   Past Surgical History:  Procedure Laterality Date    Colonoscopy N/A 10/2/2018    Procedure: COLONOSCOPY;  Surgeon: STEPHEN Dawn MD;  Location: [facility] ENDOSCOPY    Tonsillectomy  07/2016   [3]   Family History  Problem Relation Age of Onset    Diabetes Father     Cancer Father 64        Lymphoma     Prostate Cancer Father     Lipids Brother         hyperlipidemia    Other (Hyperlipidemia) Brother     Diabetes Other    [4]   Social History  Socioeconomic History    Marital status:    Tobacco Use    Smoking status: Never     Passive exposure: Never    Smokeless tobacco: Never   Vaping Use    Vaping status: Never Used   Substance and Sexual Activity    Alcohol use: No     Alcohol/week: 0.0 standard drinks of alcohol     Comment: None    Drug use: No     Comment: none    Sexual activity: Yes     Birth control/protection: OCP     Comment: none   Other Topics Concern    Caffeine Concern No    Exercise No    Pt has a pacemaker No    Pt has a defibrillator No    Reaction to local anesthetic No      Polycystic ovaries    PONV (postoperative nausea and vomiting)    Shortness of breath    Visual impairment   [2]   Past Surgical History:  Procedure Laterality Date    Colonoscopy N/A 10/2/2018    Procedure: COLONOSCOPY;  Surgeon: STEPHEN Dawn MD;  Location: [facility] ENDOSCOPY    Tonsillectomy  07/2016   [3]   Family History  Problem Relation Age of Onset    Diabetes Father     Cancer Father 64        Lymphoma     Prostate Cancer Father     Lipids Brother         hyperlipidemia    Other (Hyperlipidemia) Brother     Diabetes Other    [4]   Social History  Socioeconomic History    Marital status:    Tobacco Use    Smoking status: Never     Passive exposure: Never    Smokeless tobacco: Never   Vaping Use    Vaping status: Never Used   Substance and Sexual Activity    Alcohol use: No     Alcohol/week: 0.0 standard drinks of alcohol     Comment: None    Drug use: No     Comment: none    Sexual activity: Yes     Birth control/protection: OCP     Comment: none   Other Topics Concern    Caffeine Concern No    Exercise No    Pt has a pacemaker No    Pt has a defibrillator No    Reaction to local anesthetic No

## 2025-04-27 NOTE — ED INITIAL ASSESSMENT (HPI)
Pt came back to ED for continued HA. Reports following up with PCP and having eyes checked. Meds at home not helping. RR even and nonlabored, speaking in full sentences, ambulatory with steady gait. Denies trauma, no blood thinners.

## 2025-04-27 NOTE — ED QUICK NOTES
Patient A&OX4, denies SOB/CP/Dizziness. Tolerable pain. Discharge instructions given. All questions answered. Ambulatory home with family.

## 2025-05-05 ENCOUNTER — TELEPHONE (OUTPATIENT)
Dept: NEUROLOGY | Facility: CLINIC | Age: 30
End: 2025-05-05

## 2025-05-05 NOTE — TELEPHONE ENCOUNTER
Contacted pt no answer [email or web address] offered  @ 2:20pm at [place] office.         Елена Hernandez MD [facility] [place] [facility]  Hi! Can you please reach out to this patient and see if we can add her in this week for new headaches? She is a new patient. I can add her tomorrow at [place] at 2:20 but she can’t come that day, can over book her on Friday. Anton Salazar   [phone].      93

## 2025-05-06 ENCOUNTER — OFFICE VISIT (OUTPATIENT)
Dept: NEUROLOGY | Facility: CLINIC | Age: 30
End: 2025-05-06
Payer: COMMERCIAL

## 2025-05-06 VITALS
WEIGHT: 280 LBS | HEART RATE: 102 BPM | BODY MASS INDEX: 47.22 KG/M2 | RESPIRATION RATE: 16 BRPM | DIASTOLIC BLOOD PRESSURE: 72 MMHG | HEIGHT: 64.5 IN | SYSTOLIC BLOOD PRESSURE: 158 MMHG

## 2025-05-06 DIAGNOSIS — G43.709 CHRONIC MIGRAINE W/O AURA W/O STATUS MIGRAINOSUS, NOT INTRACTABLE: Primary | ICD-10-CM

## 2025-05-06 PROBLEM — J35.01 CHRONIC TONSILLITIS: Status: ACTIVE | Noted: 2025-05-06

## 2025-05-06 PROBLEM — R11.0 NAUSEA: Status: RESOLVED | Noted: 2025-02-28 | Resolved: 2025-05-06

## 2025-05-06 PROBLEM — E66.01 MORBID OBESITY (HCC): Status: ACTIVE | Noted: 2020-08-11

## 2025-05-06 PROCEDURE — 3078F DIAST BP <80 MM HG: CPT | Performed by: INTERNAL MEDICINE

## 2025-05-06 PROCEDURE — 99205 OFFICE O/P NEW HI 60 MIN: CPT | Performed by: INTERNAL MEDICINE

## 2025-05-06 PROCEDURE — 3077F SYST BP >= 140 MM HG: CPT | Performed by: INTERNAL MEDICINE

## 2025-05-06 PROCEDURE — 3008F BODY MASS INDEX DOCD: CPT | Performed by: INTERNAL MEDICINE

## 2025-05-06 PROCEDURE — G2211 COMPLEX E/M VISIT ADD ON: HCPCS | Performed by: INTERNAL MEDICINE

## 2025-05-06 RX ORDER — BACLOFEN 10 MG/1
10 TABLET ORAL 2 TIMES DAILY PRN
COMMUNITY
Start: 2025-04-25 | End: 2025-05-06

## 2025-05-06 RX ORDER — METFORMIN HYDROCHLORIDE 500 MG/1
2000 TABLET, EXTENDED RELEASE ORAL
COMMUNITY
Start: 2024-11-15

## 2025-05-06 RX ORDER — ROSUVASTATIN CALCIUM 20 MG/1
TABLET, COATED ORAL
COMMUNITY
Start: 2025-04-14

## 2025-05-06 RX ORDER — DEXTROMETHORPHAN HYDROBROMIDE AND PROMETHAZINE HYDROCHLORIDE 15; 6.25 MG/5ML; MG/5ML
SYRUP ORAL
COMMUNITY
Start: 2025-03-24 | End: 2025-05-06

## 2025-05-06 RX ORDER — SUMATRIPTAN 50 MG/1
TABLET, FILM COATED ORAL
Qty: 9 TABLET | Refills: 5 | Status: SHIPPED | OUTPATIENT
Start: 2025-05-06

## 2025-05-06 RX ORDER — ALBUTEROL SULFATE 90 UG/1
INHALANT RESPIRATORY (INHALATION)
COMMUNITY
Start: 2025-03-24 | End: 2025-05-06

## 2025-05-06 RX ORDER — BENZONATATE 100 MG/1
CAPSULE ORAL
COMMUNITY
Start: 2025-03-24 | End: 2025-05-06

## 2025-05-06 RX ORDER — TIRZEPATIDE 2.5 MG/.5ML
INJECTION, SOLUTION SUBCUTANEOUS
COMMUNITY
Start: 2025-05-06

## 2025-05-06 RX ORDER — ROSUVASTATIN CALCIUM 10 MG/1
10 TABLET, COATED ORAL DAILY
COMMUNITY
Start: 2024-11-13 | End: 2025-05-06

## 2025-05-06 NOTE — PATIENT INSTRUCTIONS
VISIT SUMMARY:  Today, you were seen for persistent headaches that have been affecting your daily life for the past three months. We discussed your history of severe headaches, possible triggers, and the treatments you have tried so far. We also reviewed your type 2 diabetes management and your future pregnancy plans.    YOUR PLAN:  -MIGRAINE: Migraines are severe headaches often accompanied by other symptoms like vision changes and sensitivity to light. We will order an MRI of your brain and MRV to rule out other causes. You will be prescribed sumatriptan to help stop the headaches when they start. Please stop taking Excedrin as it may be contributing to your headaches. If your migraines continue, we may consider a preventive medication like topiramate. We also discussed the side effects of sumatriptan and other alternatives like Nurtec and Ubrelvy. It's important to manage stress, maintain good sleep habits, and stay hydrated.      -GOALS OF CARE: We discussed the need for a short-term migraine treatment plan due to your future pregnancy plans. It's important to have a strategy to stop medications before pregnancy.    INSTRUCTIONS:  Please schedule an MRI of the brain and MRV as soon as possible. Follow up in four weeks to assess how well the new treatment is working. If you have any questions or concerns before then, please contact our office.    Contains text generated by Louise

## 2025-05-06 NOTE — PROGRESS NOTES
[address], [address]  [facility] [address]  [phone]            Neurology Initial Visit     Referred By: Dr. Garcia ref. provider found    Chief Complaint:   Chief Complaint   Patient presents with    Neurologic Problem     Patient presents here today to establish care, patient was seen and referred by [facility] on April 27, 2025 to follow-up. Patient c/o a 9/10 headache that began 10 days ago. This is simialr to previous headaches and ear issues. Current Neuro medication Excedrin  Patient gives verbal consent to use [facility].        History of Present Illness  Anton Salazar is a 29 year old female with type 2 diabetes who presents with persistent headaches.     She has a history of prior migraines which were infrequent but severe.  Would have 1 migraine over the course of many months, and would take Tylenol, drink water, and sleep it off with good improvement.  Then, 3 months ago her headache pattern changed to where she developed a severe headache lasting 3 days straight.  She had been taking Mounjaro and they had been increasing it gradually.  The severe headache coincided with when she increased it to 7.5 mg.  She went to the emergency room and headache subsided thereafter.  She did well for 2 further months and then had recurrence of the headache about 3 weeks ago.  This headache became debilitating to where she could not get up, had to call sick to work for 2 days.  She went to the ED and received a migraine cocktail and it helped, but headache persisted.  She tried treating it with Excedrin 4 times a day and Benadryl for 1 week straight without much improvement.  Then 3 days ago she decided to stop the Excedrin.  Headache is starting to improve slightly but is still constant and present.  Headaches are described as being all over her head, affecting her vision slightly.    Her optometrist conducted an eye exam, including retinal imaging, which was normal.  She has a family history of migraines in her mother.. She experiences occasional vision changes and heaviness in her legs during headaches but no blackout of vision or significant neurological deficits.    Her sleep has been affected, with difficulty maintaining a regular sleep schedule due to the severity of the headaches. No snoring or history of a sleep study.        Past Medical History[1]    Past Surgical History[2]    Social history:  History   Smoking Status    Never   Smokeless Tobacco    Never     History   Alcohol Use No     Comment: None     History   Drug Use No     Comment: none       Family History[3]    Medications - Current[4]    Allergies[5]    ROS:   As in HPI, the rest of the 14 system review was done and was negative      Physical Exam:  Vitals:    05/06/25 1446   BP: 158/72   Pulse: 102   Resp: 16   Weight: 280 lb (127 kg)   Height: 64.5\"       General: No apparent distress, well nourished, well groomed.  Head- Normocephalic, atraumatic  Eyes- No redness or swelling    Neurological:   Mental Status:  Mental Status- Alert, conversant, speech fluent, following all commands    Cranial Nerves:  II.- Visual fields full to confrontation,       Fundoscopic Exam- Sharp optic discs, no pallor, III, IV, VI- EOM intact, and VII. Face symmetric, no facial weakness    Motor Exam:  Strength- upper extremities 5/5 proximally and distally                - lower  extremities 5/5 proximally and distally    Sensory Exam:  Light touch- intact in all 4 extremities    Deep Tendon Reflexes:  Biceps 2+ bilateral symmetric  Triceps 2+ bilateral symmetric  Brachioradialis 2 + bilateral symmetric  Patellar 1+ bilateral symmetric  Ankle jerks Absent    Coordination:  No dysmetria with finger to nose bilaterally    Gait:  Normal casual gait    Labs:    Lab Results   Component Value Date    TSH 3.280 01/19/2021     Lab Results   Component Value Date    HDL 43 12/29/2017     (H) 12/29/2017    TRIG 371 (H) 12/29/2017      Lab Results   Component Value Date    HGB 16.0 04/24/2025    HCT 46.3 04/24/2025    MCV 80.0 04/24/2025    WBC 14.2 (H) 04/24/2025    .0 04/24/2025      Lab Results   Component Value Date    BUN 10 04/27/2025    CA 9.7 04/27/2025    ALT 34 01/13/2025    AST 21 01/13/2025    ALKPHOS 65 02/01/2016    ALB 4.8 01/13/2025     04/27/2025    K 4.2 04/27/2025     04/27/2025    CO2 26.0 04/27/2025      I have reviewed labs.    Imaging Studies:  I have independently reviewed imaging.  CT brain from ED neg    Assessment & Plan  Migraine  Chronic migraines for three months with severe episodes. Possible triggers include stress and lifestyle changes. Likely migraine with some superimposed medication overuse.  Lingering headache could be tension, but severity of prior headache is consistent with mgiraine. Emphasized trigger identification and stress/sleep management.   - MRI of the brain and MRV.  - Sumatriptan 50mg at immediate onset of migraine, repeat in 2hrs if needed. .  - Advise discontinuation of Excedrin, discussed connection of excedrin overuse with propagating headache..  - Consider topiramate for prevention if migraines persist, but she is planning for pregnancy in 4 to 5 months so would have to use for short-term only.  - Discuss sumatriptan side effects and alternatives (Nurtec, Ubrelvy).  - Encourage lifestyle modifications: stress management, sleep hygiene, hydration.  - Follow-up in four weeks to assess treatment efficacy.      Goals of Care  Short-term migraine treatment plan needed due to future pregnancy plans. Emphasized medication exit strategy before pregnancy.       Education and counseling provided to patient. Instructed patient to call my office or seek medical attention immediately if symptoms worsen.  Patient verbalized understanding of information given. All questions were answered. All side effects of drugs were discussed.     I have spent 65 min total time on the day of the  encounter, including: Total time spent reasons:  Preparing to see the patient, Obtaining and/or reviewing separately obtained history, Performing a medically appropriate examination and/or evaluation, Counseling and educating the patient/family/caregiver, Referring and communicating with other health care professionals, Documenting clinical information in Epic, and Independently interpreting results and communicating results to the patient/family/caregiver      Return to clinic in: Return in about 4 weeks (around 6/3/2025).    Елена Hernandez MD         [1]   Past Medical History:   Anxiety state    Colon polyp    repeat CLN in 5 years    Diabetes mellitus type 2, uncomplicated (HCC)    High cholesterol    Hyperlipidemia    Obesity    Polycystic ovaries    PONV (postoperative nausea and vomiting)    Shortness of breath    Visual impairment   [2]   Past Surgical History:  Procedure Laterality Date    Colonoscopy N/A 10/2/2018    Procedure: COLONOSCOPY;  Surgeon: STEPHEN Dawn MD;  Location: [facility] ENDOSCOPY    Tonsillectomy  07/2016   [3]   Family History  Problem Relation Age of Onset    Diabetes Father     Cancer Father 64        Lymphoma     Prostate Cancer Father     Lipids Brother         hyperlipidemia    Other (Hyperlipidemia) Brother     Diabetes Other    [4]   Current Outpatient Medications:     metFORMIN  MG Oral Tablet 24 Hr, Take 4 tablets (2,000 mg total) by mouth daily with dinner., Disp: , Rfl:     rosuvastatin 20 MG Oral Tab, TAKE ONE TABLET ORALLY AT BEDTIME, Disp: , Rfl:     MOUNJARO 2.5 MG/0.5ML Subcutaneous Solution Auto-injector, , Disp: , Rfl:     SUMAtriptan 50 MG Oral Tab, Use at onset; repeat once after 2 hours-ONLY 2 IN 24 HR MAX. This is a 30 day supply., Disp: 9 tablet, Rfl: 5    Tirzepatide 7.5 MG/0.5ML Subcutaneous Solution Auto-injector, Inject 7.5 mg into the skin once a week., Disp: , Rfl:     Alcohol Swabs (ALCOHOL PREP) 70 % Does not apply Pads, U BID., Disp: , Rfl:     Blood  Glucose Monitoring Suppl (ONETOUCH ULTRA 2) w/Device Does not apply Kit, FOR HOME USE BID., Disp: , Rfl:     ONETOUCH ULTRA In Vitro Strip, TEST BID., Disp: , Rfl:     Lancets (ONETOUCH DELICA PLUS VULSIB23Q) Does not apply Misc, , Disp: , Rfl:   [5]   Allergies  Allergen Reactions    Amoxicillin HIVES

## 2025-05-11 ENCOUNTER — HOSPITAL ENCOUNTER (OUTPATIENT)
Dept: MRI IMAGING | Facility: HOSPITAL | Age: 30
End: 2025-05-11
Attending: INTERNAL MEDICINE
Payer: COMMERCIAL

## 2025-05-11 ENCOUNTER — HOSPITAL ENCOUNTER (OUTPATIENT)
Dept: MRI IMAGING | Facility: HOSPITAL | Age: 30
Discharge: HOME | End: 2025-05-11
Attending: INTERNAL MEDICINE
Payer: COMMERCIAL

## 2025-05-11 DIAGNOSIS — G43.709 CHRONIC MIGRAINE W/O AURA W/O STATUS MIGRAINOSUS, NOT INTRACTABLE: ICD-10-CM

## 2025-05-11 PROCEDURE — 70553 MRI BRAIN STEM W/O & W/DYE: CPT | Performed by: INTERNAL MEDICINE

## 2025-05-11 PROCEDURE — 70544 MR ANGIOGRAPHY HEAD W/O DYE: CPT | Performed by: INTERNAL MEDICINE

## 2025-05-11 PROCEDURE — A9575 INJ GADOTERATE MEGLUMI 0.1ML: HCPCS | Performed by: INTERNAL MEDICINE

## 2025-05-11 RX ORDER — GADOTERATE MEGLUMINE 376.9 MG/ML
20 INJECTION INTRAVENOUS
Status: COMPLETED | OUTPATIENT
Start: 2025-05-11 | End: 2025-05-11

## 2025-05-11 RX ADMIN — GADOTERATE MEGLUMINE 20 ML: 376.9 INJECTION INTRAVENOUS at 15:10:00

## (undated) DEVICE — NDL ASP 21GA 2MM STRL DISP

## (undated) DEVICE — FORCEP RADIAL JAW 4

## (undated) DEVICE — SNARE 9MM 230CM 2.4MM EXACTO

## (undated) DEVICE — NEEDLE ASP VIZISHOT 19G 2MM

## (undated) DEVICE — SINGLE USE SUCTION VALVE MAJ-209: Brand: SINGLE USE SUCTION VALVE (STERILE)

## (undated) DEVICE — SNARE OPTMZ PLPCTM TRP

## (undated) DEVICE — ENDOSCOPY PACK UPPER: Brand: MEDLINE INDUSTRIES, INC.

## (undated) DEVICE — Device: Brand: DEFENDO AIR/WATER/SUCTION AND BIOPSY VALVE

## (undated) DEVICE — TRAP MCS 40ML 5IN PLS SCR CAP

## (undated) DEVICE — ENDOSCOPY PACK - LOWER: Brand: MEDLINE INDUSTRIES, INC.

## (undated) DEVICE — SINGLE USE BIOPSY VALVE MAJ-210: Brand: SINGLE USE BIOPSY VALVE (STERILE)

## (undated) NOTE — ED AVS SNAPSHOT
Stefan Harris   MRN: [id number]    Department:  [facility] Emergency Department   Date of Visit:  10/3/2019           Disclosure     Insurance plans vary and the physician(s) referred by the ER may not be covered by your plan.  Please contact CARE PHYSICIAN AT ONCE OR RETURN IMMEDIATELY TO THE EMERGENCY DEPARTMENT. If you have been prescribed any medication(s), please fill your prescription right away and begin taking the medication(s) as directed.   If you believe that any of the medications

## (undated) NOTE — ED AVS SNAPSHOT
Everalvaro Ramirez   MRN: [id number]    Department:  [facility] Emergency Department   Date of Visit:  5/16/2019           Disclosure     Insurance plans vary and the physician(s) referred by the ER may not be covered by your plan.  Please contact CARE PHYSICIAN AT ONCE OR RETURN IMMEDIATELY TO THE EMERGENCY DEPARTMENT. If you have been prescribed any medication(s), please fill your prescription right away and begin taking the medication(s) as directed.   If you believe that any of the medications

## (undated) NOTE — ED AVS SNAPSHOT
[facility] Emergency Department    [address] [place] [address]    Phone:  [phone]    Fax:  Marina Mccarthy   MRN: [id number]    Department:  [facility] Emergency Department   Date of Visit:  3/15/20 and Class Registration line at [phone] or find a doctor online by visiting [email or web address].    IF THERE IS ANY CHANGE OR WORSENING OF YOUR CONDITION, CALL YOUR PRIMARY CARE PHYSICIAN AT ONCE OR RETURN IMMEDIATELY TO [address] [place] [address].     If

## (undated) NOTE — ED AVS SNAPSHOT
Rick Alston   MRN: [id number]    Department:  [facility] Emergency Department   Date of Visit:  9/11/2018           Disclosure     Insurance plans vary and the physician(s) referred by the ER may not be covered by your plan.  Please contact CARE PHYSICIAN AT ONCE OR RETURN IMMEDIATELY TO THE EMERGENCY DEPARTMENT. If you have been prescribed any medication(s), please fill your prescription right away and begin taking the medication(s) as directed.   If you believe that any of the medications

## (undated) NOTE — ED AVS SNAPSHOT
Ever Ashley   MRN: [id number]    Department:  [facility] Emergency Department   Date of Visit:  9/27/2018           Disclosure     Insurance plans vary and the physician(s) referred by the ER may not be covered by your plan.  Please contact CARE PHYSICIAN AT ONCE OR RETURN IMMEDIATELY TO THE EMERGENCY DEPARTMENT. If you have been prescribed any medication(s), please fill your prescription right away and begin taking the medication(s) as directed.   If you believe that any of the medications

## (undated) NOTE — LETTER
[facility]  [address]  [place] [address]      10/29/2018    Dear Bonnie Ross,    It has come to our attention that a required CT CHEST test is due in November. This test was ordered by Dr. Gloria Reynolds. This test requires a prior authorization.  The

## (undated) NOTE — ED AVS SNAPSHOT
Khadar Pizarro   MRN: [id number]    Department:  [facility] Emergency Department   Date of Visit:  5/2/2019           Disclosure     Insurance plans vary and the physician(s) referred by the ER may not be covered by your plan.  Please contact CARE PHYSICIAN AT ONCE OR RETURN IMMEDIATELY TO THE EMERGENCY DEPARTMENT. If you have been prescribed any medication(s), please fill your prescription right away and begin taking the medication(s) as directed.   If you believe that any of the medications

## (undated) NOTE — LETTER
Laron Anderson, [address], [address] [facility]       01/18/18        Patient: Rosalina Sanchez   YOB: 1995   Date of Visit: 1/18/2018       Dear Leopold Glen:           As you know, Fouzia Flores is a 15-year-old female who I am now evalu clear to auscultation and percussion. Cardiac regular rate and rhythm no murmur. Abdomen nontender, without hepatosplenomegaly and no mass appreciable. Extremities without clubbing cyanosis nor edema.  Neurologic grossly intact with symmetric tone and stren PROBLEM 2.  Emesis episode–likely viral gastroenteritis. Resolved clinically. RECOMMENDATIONS:  1. Expectant management      I am delighted to assist in Anton's care.             With warmest regards,         Johan Coyle MD   [facility]

## (undated) NOTE — ED AVS SNAPSHOT
Luh Grace   MRN: [id number]    Department:  [facility] Emergency Department   Date of Visit:  1/8/2018           Disclosure     Insurance plans vary and the physician(s) referred by the ER may not be covered by your plan.  Please contact CARE PHYSICIAN AT ONCE OR RETURN IMMEDIATELY TO THE EMERGENCY DEPARTMENT. If you have been prescribed any medication(s), please fill your prescription right away and begin taking the medication(s) as directed.   If you believe that any of the medications

## (undated) NOTE — LETTER
[address] [place] [address]  Authorization for Invasive Procedures  1. I hereby authorize Dr. Bobo Herman , my physician and whomever may be designated as the doctor's assistant, to perform the following operation and/or procedure:   Co 5. I consent to the photographing of the operations or procedures to be performed for the purposes of advancing medicine, science, and/or education, provided my identity is not revealed.  If the procedure has been videotaped, the physician/surgeon will obta Witness Signature: ____________________________________________ Date: __________ Time: ___________    Statement of Physician  My signature below affirms that prior to the time of the procedure, I have explained to the patient and/or her legal representativ

## (undated) NOTE — LETTER
[address]  [place], [place] [address]       10/23/17        Patient: Bynum Galeazzi   YOB: 1995   Date of Visit: 10/23/2017       Dear  Dr. Memo Mancilla MD,      Thank you for referring Bynum Galeazzi to my practice.

## (undated) NOTE — ED AVS SNAPSHOT
[facility] Emergency Department    [address] [address]    Phone:  [phone]    Fax:  Marina Mccarthy   MRN: [id number]    Department:  [facility] Emergency Department   Date of Visit:  3/15/20 It is our goal to assure that you are completely satisfied with every aspect of your visit today.   In an effort to constantly improve our service to you, we would appreciate any positive or negative feedback related to the care you received in our emergenc Szl account. You may have had testing done that requires us to contact you. Please make sure we have your correct phone number on file.       I certified that I have received a copy of the aftercare instructions; that these instructions have been expl [employer] will allow you to access patient instructions from your recent visit,  view other health information, and more. To sign up or find more information, go to [email or web address] and click on the Sign Up Now link in the [employer] box.      Enter

## (undated) NOTE — LETTER
8/2/2023    [facility]        [address]        [facility] [address]            Dear Alyson Gomes,      Our records indicate that you are due for an appointment for a Colonoscopy with Franco Dewitt MD. Our doctors are booking out about 3-5 months in advance for procedures. Please call our office to schedule a phone screening appointment to plan for the procedure(s). Your medical well-being is important to us. If your insurance requires a referral, please call your primary care office to request one.       Thank you,      The Physicians and Staff at [facility]

## (undated) NOTE — LETTER
10/2/2018              [facility] [address] [facility]         Dear Glen Geiger,    I reviewed the pathology report from the biopsies done during your recent upper endoscopy.  Based on the report, you have NO evidence of H.pylori

## (undated) NOTE — LETTER
[facility]  Administration of Anesthesia  1. I, Anton Salazar, or _________________________________ acting on her behalf, (Patient) (Dependent/Representative) request to receive anesthesia for my pending procedure/operation/treatment. 6. OBSTETRIC PATIENTS: Specific risks/consequences of spinal/epidural anesthesia may include itching, low blood pressure, difficulty urinating, slowing of the baby's heart rate and headache.  Rare risks include infections, high spinal block, spinal bleeding ___________________________________________________           _____________________________________________________  Date/Time                                                                                               Responsible person in case of minor

## (undated) NOTE — ED AVS SNAPSHOT
Wen Muñoz   MRN: [id number]    Department:  [facility] Department   Date of Visit:  10/1/2019           Disclosure     Insurance plans vary and the physician(s) referred by the ER may not be covered by your plan.  Please contact CARE PHYSICIAN AT ONCE OR RETURN IMMEDIATELY TO THE EMERGENCY DEPARTMENT. If you have been prescribed any medication(s), please fill your prescription right away and begin taking the medication(s) as directed.   If you believe that any of the medications

## (undated) NOTE — LETTER
JASON NEIL  [address]  [place] [address]      4/24/2018    Dear Francesca Steele,    It has come to our attention that a required CT CHEST test is due in May. This test was ordered by Dr. Bob Bull. This test requires a prior authorization.  The Manag